# Patient Record
Sex: FEMALE | Race: WHITE | NOT HISPANIC OR LATINO | Employment: OTHER | URBAN - METROPOLITAN AREA
[De-identification: names, ages, dates, MRNs, and addresses within clinical notes are randomized per-mention and may not be internally consistent; named-entity substitution may affect disease eponyms.]

---

## 2018-02-09 LAB
ANION GAP SERPL CALCULATED.3IONS-SCNC: 10 MMOL/L (ref 10–20)
BICARBONATE: 31 MMOL/L (ref 21–32)
BUN / CREAT RATIO: 38 (ref 5–25)
CALCIUM SERPL-MCNC: 8.6 MG/DL (ref 8.6–10.3)
CHLORIDE BLD-SCNC: 97 MMOL/L (ref 98–107)
CREAT SERPL-MCNC: 0.73 MG/DL (ref 0.5–1.05)
ERYTHROCYTE [DISTWIDTH] IN BLOOD BY AUTOMATED COUNT: 12.6 % (ref 12–15.4)
ERYTHROCYTE [DISTWIDTH] IN BLOOD BY AUTOMATED COUNT: 43 FL (ref 39.3–48.6)
GFR CALCULATED: >60
GLUCOSE: 219 MG/DL (ref 70–100)
HCT VFR BLD CALC: 36.6 % (ref 36.5–46.6)
HEMOGLOBIN: 12.5 G/DL (ref 11.8–15.3)
MCH RBC QN AUTO: 31.6 PG (ref 27.5–33)
MCHC RBC AUTO-ENTMCNC: 34.2 G/DL (ref 30.1–35)
MCV RBC AUTO: 92.4 FL (ref 85.4–100)
NUCLEATED RBCS: 0 /100{WBCS}
PLATELET # BLD: 237 10*3/UL (ref 155–404)
PMV BLD AUTO: 10 FL (ref 9.9–12.1)
POTASSIUM SERPL-SCNC: 4.9 MMOL/L (ref 3.5–5.1)
RBC: 3.96 10*6/UL (ref 3.85–5.1)
RBCS COUNTED: 0 10*3/UL
SODIUM BLD-SCNC: 133 MMOL/L (ref 136–145)
UREA NITROGEN: 28 MG/DL (ref 6–23)
WBC: 5.8 10*3/UL (ref 4.4–9.9)

## 2023-03-08 ENCOUNTER — NURSING HOME VISIT (OUTPATIENT)
Dept: POST ACUTE CARE | Facility: EXTERNAL LOCATION | Age: 88
End: 2023-03-08
Payer: MEDICARE

## 2023-03-08 DIAGNOSIS — E03.9 ACQUIRED HYPOTHYROIDISM: ICD-10-CM

## 2023-03-08 DIAGNOSIS — R29.6 FALLS FREQUENTLY: ICD-10-CM

## 2023-03-08 DIAGNOSIS — J44.9 CHRONIC OBSTRUCTIVE PULMONARY DISEASE, UNSPECIFIED COPD TYPE (MULTI): Primary | ICD-10-CM

## 2023-03-08 DIAGNOSIS — R54 AGE-RELATED PHYSICAL DEBILITY: ICD-10-CM

## 2023-03-08 DIAGNOSIS — I50.32 CHRONIC DIASTOLIC HEART FAILURE (MULTI): ICD-10-CM

## 2023-03-08 PROCEDURE — 99308 SBSQ NF CARE LOW MDM 20: CPT | Performed by: INTERNAL MEDICINE

## 2023-03-08 NOTE — LETTER
Subjective  Patient ID: Naida Bruno is a 89 y.o. female who is long term resident being seen and evaluated for multiple medical problems.    Patient was seen for long-term care visit, after seen by me in the beginning actually patient looks much better, today patient was sitting upright, patient was eating her meals, she was assisted, she is 89-year-old female patient, she is admitted here for long-term care, multiple ER visits, multiple hospitalization, multiple falls and vision impairment, unable to see, unable to take care of herself, age-related physical decline, cognitive decline, absolutely impaired ADLs, frequent falls and fractures, patient is doing well at least 2 to 3 weeks of stay over here.  Medications are reviewed.  Laboratories are reviewed.  Skin assessment was done.  Last time's evaluation were reviewed, therapeutics were reviewed.         Review of Systems   Constitutional:  Positive for fatigue. Negative for chills.   HENT:  Negative for congestion.    Eyes: Negative.    Respiratory:  Positive for shortness of breath. Negative for chest tightness.    Cardiovascular: Negative.    Gastrointestinal: Negative.    Musculoskeletal: Negative.    Skin:  Negative for rash and wound.   Neurological: Negative.        Objective  /53   Pulse 76   Wt 77.6 kg (171 lb)   BMI 31.28 kg/m²     Physical Exam  Vitals and nursing note reviewed.   Constitutional:       Appearance: Normal appearance. She is normal weight.   HENT:      Head: Atraumatic.   Eyes:      Conjunctiva/sclera: Conjunctivae normal.   Cardiovascular:      Rate and Rhythm: Normal rate and regular rhythm.   Pulmonary:      Effort: Pulmonary effort is normal.      Breath sounds: Rales present.   Abdominal:      Palpations: Abdomen is soft.   Musculoskeletal:         General: Normal range of motion.      Cervical back: Normal range of motion.   Skin:     General: Skin is warm and dry.   Neurological:      General: No focal deficit  present.   Psychiatric:         Mood and Affect: Mood is not anxious. Affect is not angry.         Behavior: Behavior normal.         Cognition and Memory: Memory is impaired. She exhibits impaired recent memory and impaired remote memory.         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  Alarms could be applied if needed.  No breathing compromise, no confusion or agitation, no infectious ailments.  Discussed with nursing staff at bedside.  Patient's prognosis remains poor.  This patient was seen for my regular monthly visit, nursing evaluations and nursing notes were reviewed, interim events are reviewed, interim concerns and messages were reviewed as we have communicated with nursing staff.  Any issues with the falls, skin care impairment, declining physical condition are reviewed and noted, diagnosis list were reviewed, list of medications were reviewed, living will related issues were reviewed, overall patient has been doing well, any declining in patient's condition or any change in patient's condition needs to be notified to physician promptly, discussed with nursing staff, if needed would communicate with family.  Patient stays confined here at the facility for long-term care, there are always concerns about long-term care related issues and concerns.  Nursing staff is trying their best to keep patient safe, all sort of measures has been taken to keep patient safe and comfortable.       Goals    None

## 2023-03-09 VITALS
WEIGHT: 171 LBS | BODY MASS INDEX: 31.28 KG/M2 | DIASTOLIC BLOOD PRESSURE: 53 MMHG | SYSTOLIC BLOOD PRESSURE: 133 MMHG | HEART RATE: 76 BPM

## 2023-03-09 ASSESSMENT — ENCOUNTER SYMPTOMS
EYES NEGATIVE: 1
FATIGUE: 1
MUSCULOSKELETAL NEGATIVE: 1
NEUROLOGICAL NEGATIVE: 1
CHILLS: 0
WOUND: 0
CHEST TIGHTNESS: 0
SHORTNESS OF BREATH: 1
GASTROINTESTINAL NEGATIVE: 1
CARDIOVASCULAR NEGATIVE: 1

## 2023-03-10 NOTE — PROGRESS NOTES
Subjective   Patient ID: Naida Bruno is a 89 y.o. female who is long term resident being seen and evaluated for multiple medical problems.    Patient was seen for long-term care visit, after seen by me in the beginning actually patient looks much better, today patient was sitting upright, patient was eating her meals, she was assisted, she is 89-year-old female patient, she is admitted here for long-term care, multiple ER visits, multiple hospitalization, multiple falls and vision impairment, unable to see, unable to take care of herself, age-related physical decline, cognitive decline, absolutely impaired ADLs, frequent falls and fractures, patient is doing well at least 2 to 3 weeks of stay over here.  Medications are reviewed.  Laboratories are reviewed.  Skin assessment was done.  Last time's evaluation were reviewed, therapeutics were reviewed.         Review of Systems   Constitutional:  Positive for fatigue. Negative for chills.   HENT:  Negative for congestion.    Eyes: Negative.    Respiratory:  Positive for shortness of breath. Negative for chest tightness.    Cardiovascular: Negative.    Gastrointestinal: Negative.    Musculoskeletal: Negative.    Skin:  Negative for rash and wound.   Neurological: Negative.        Objective   /53   Pulse 76   Wt 77.6 kg (171 lb)   BMI 31.28 kg/m²     Physical Exam  Vitals and nursing note reviewed.   Constitutional:       Appearance: Normal appearance. She is normal weight.   HENT:      Head: Atraumatic.   Eyes:      Conjunctiva/sclera: Conjunctivae normal.   Cardiovascular:      Rate and Rhythm: Normal rate and regular rhythm.   Pulmonary:      Effort: Pulmonary effort is normal.      Breath sounds: Rales present.   Abdominal:      Palpations: Abdomen is soft.   Musculoskeletal:         General: Normal range of motion.      Cervical back: Normal range of motion.   Skin:     General: Skin is warm and dry.   Neurological:      General: No focal deficit  present.   Psychiatric:         Mood and Affect: Mood is not anxious. Affect is not angry.         Behavior: Behavior normal.         Cognition and Memory: Memory is impaired. She exhibits impaired recent memory and impaired remote memory.         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  Alarms could be applied if needed.  No breathing compromise, no confusion or agitation, no infectious ailments.  Discussed with nursing staff at bedside.  Patient's prognosis remains poor.  This patient was seen for my regular monthly visit, nursing evaluations and nursing notes were reviewed, interim events are reviewed, interim concerns and messages were reviewed as we have communicated with nursing staff.  Any issues with the falls, skin care impairment, declining physical condition are reviewed and noted, diagnosis list were reviewed, list of medications were reviewed, living will related issues were reviewed, overall patient has been doing well, any declining in patient's condition or any change in patient's condition needs to be notified to physician promptly, discussed with nursing staff, if needed would communicate with family.  Patient stays confined here at the facility for long-term care, there are always concerns about long-term care related issues and concerns.  Nursing staff is trying their best to keep patient safe, all sort of measures has been taken to keep patient safe and comfortable.       Goals    None

## 2023-04-05 ENCOUNTER — NURSING HOME VISIT (OUTPATIENT)
Dept: POST ACUTE CARE | Facility: EXTERNAL LOCATION | Age: 88
End: 2023-04-05
Payer: MEDICARE

## 2023-04-05 DIAGNOSIS — I10 PRIMARY HYPERTENSION: ICD-10-CM

## 2023-04-05 DIAGNOSIS — E03.9 ACQUIRED HYPOTHYROIDISM: ICD-10-CM

## 2023-04-05 DIAGNOSIS — R53.81 PHYSICAL DECONDITIONING: ICD-10-CM

## 2023-04-05 DIAGNOSIS — J44.9 CHRONIC OBSTRUCTIVE PULMONARY DISEASE, UNSPECIFIED COPD TYPE (MULTI): Primary | ICD-10-CM

## 2023-04-05 DIAGNOSIS — I50.32 CHRONIC DIASTOLIC HEART FAILURE (MULTI): ICD-10-CM

## 2023-04-05 PROCEDURE — 99308 SBSQ NF CARE LOW MDM 20: CPT | Performed by: INTERNAL MEDICINE

## 2023-04-05 NOTE — LETTER
Patient: Naida Bruno  : 1922    Encounter Date: 2023    Subjective  Patient ID: Naida Bruno is a 89 y.o. female who is long term resident being seen and evaluated for multiple medical problems.    89-year-old female patient, condition remains unchanged, she is legally blind, she is asking me that anything can be done about her legal blindness test tests and I told that no, patient has a frequent falls and hence patient has been admitted here for long-term care, patient remains on Lexapro, she has history of multiple falls, fractures, generalized debility, unsteadiness of gait, patient has hypothyroidism, history of diastolic heart failure, there is no fluid overload, intermittent confusion is present, nursing staff did not have any questions or concerns, daily routine has been reviewed, essentially bedbound, essentially completely dependent upon the people for care and activities, appetite has been fair, periodic lab has been showing mild anemia, comfort care and supportive care has been continued.         Review of Systems  onstitutional:  Positive for fatigue. Negative for chills.   HENT:  Negative for congestion.    Eyes: legally blind  Respiratory:  Positive for shortness of breath. Negative for chest tightness.    Cardiovascular: Negative.    Gastrointestinal: Negative.    Musculoskeletal: Negative.    Skin:  Negative for rash and wound.   Neurological: Negative.      Objective  /63   Pulse 73   Wt 74.8 kg (165 lb)   BMI 30.18 kg/m²     Physical Exam  Vitals and nursing note reviewed.   Constitutional:       Appearance: Normal appearance. She is normal weight.   HENT:      Head: Atraumatic.   Eyes:      Conjunctiva/sclera: Conjunctivae normal.   Cardiovascular:      Rate and Rhythm: Normal rate and regular rhythm.   Pulmonary:      Effort: Pulmonary effort is normal.      Breath sounds: Rales present.   Abdominal:      Palpations: Abdomen is soft.   Musculoskeletal:          General: Normal range of motion.      Cervical back: Normal range of motion.   Skin:     General: Skin is warm and dry.   Neurological:      General: No focal deficit present.   Psychiatric:         Mood and Affect: Mood is not anxious. Affect is not angry.         Behavior: Behavior normal.         Cognition and Memory: Memory is impaired. She exhibits impaired recent memory and impaired remote memory.     Assessment/Plan  Problem List Items Addressed This Visit          Respiratory    Chronic obstructive pulmonary disease (CMS/HCC) - Primary       Circulatory    Hypertension    Chronic diastolic heart failure (CMS/HCC)       Endocrine/Metabolic    Acquired hypothyroidism       Other    Physical deconditioning   Patient's general condition is unchanged, quite debilitated female, elderly female patient, absolutely poor physical health, no fluid overload, Lexapro is a longstanding therapy, does not have any pain and discomfort, maintain therapy as listed, pressure sores needs to be prevented, frequent monitoring and assessment of patient's condition needs to be done, no pain or discomfort to be out of ordinary, infrequent laboratories has been done, blood sugars are reviewed, hemodynamics are reviewed, prognosis remains poor, stable pattern, daily routine needs to be well kept, excellent nursing care has been provided.  This patient was seen for my regular monthly visit, nursing evaluations and nursing notes were reviewed, interim events are reviewed, interim concerns and messages were reviewed as we have communicated with nursing staff.  Any issues with the falls, skin care impairment, declining physical condition are reviewed and noted, diagnosis list were reviewed, list of medications were reviewed, living will related issues were reviewed, overall patient has been doing well, any declining in patient's condition or any change in patient's condition needs to be notified to physician promptly, discussed with  nursing staff, if needed would communicate with family.  Patient stays confined here at the facility for long-term care, there are always concerns about long-term care related issues and concerns.  Nursing staff is trying their best to keep patient safe, all sort of measures has been taken to keep patient safe and comfortable.      Goals    None           Electronically Signed By: Jayesh Arzate MD   4/7/23  8:39 PM

## 2023-04-07 VITALS
WEIGHT: 165 LBS | DIASTOLIC BLOOD PRESSURE: 63 MMHG | SYSTOLIC BLOOD PRESSURE: 140 MMHG | HEART RATE: 73 BPM | BODY MASS INDEX: 30.18 KG/M2

## 2023-04-07 PROBLEM — I50.32 CHRONIC DIASTOLIC HEART FAILURE (MULTI): Status: ACTIVE | Noted: 2023-04-07

## 2023-04-07 PROBLEM — R53.81 PHYSICAL DECONDITIONING: Status: ACTIVE | Noted: 2023-04-07

## 2023-04-07 PROBLEM — I10 HYPERTENSION: Status: ACTIVE | Noted: 2023-04-07

## 2023-04-07 PROBLEM — J44.9 CHRONIC OBSTRUCTIVE PULMONARY DISEASE (MULTI): Status: ACTIVE | Noted: 2023-04-07

## 2023-04-07 PROBLEM — E03.9 ACQUIRED HYPOTHYROIDISM: Status: ACTIVE | Noted: 2023-04-07

## 2023-04-08 NOTE — PROGRESS NOTES
Subjective   Patient ID: Naida Bruno is a 89 y.o. female who is long term resident being seen and evaluated for multiple medical problems.    89-year-old female patient, condition remains unchanged, she is legally blind, she is asking me that anything can be done about her legal blindness test tests and I told that no, patient has a frequent falls and hence patient has been admitted here for long-term care, patient remains on Lexapro, she has history of multiple falls, fractures, generalized debility, unsteadiness of gait, patient has hypothyroidism, history of diastolic heart failure, there is no fluid overload, intermittent confusion is present, nursing staff did not have any questions or concerns, daily routine has been reviewed, essentially bedbound, essentially completely dependent upon the people for care and activities, appetite has been fair, periodic lab has been showing mild anemia, comfort care and supportive care has been continued.         Review of Systems  onstitutional:  Positive for fatigue. Negative for chills.   HENT:  Negative for congestion.    Eyes: legally blind  Respiratory:  Positive for shortness of breath. Negative for chest tightness.    Cardiovascular: Negative.    Gastrointestinal: Negative.    Musculoskeletal: Negative.    Skin:  Negative for rash and wound.   Neurological: Negative.      Objective   /63   Pulse 73   Wt 74.8 kg (165 lb)   BMI 30.18 kg/m²     Physical Exam  Vitals and nursing note reviewed.   Constitutional:       Appearance: Normal appearance. She is normal weight.   HENT:      Head: Atraumatic.   Eyes:      Conjunctiva/sclera: Conjunctivae normal.   Cardiovascular:      Rate and Rhythm: Normal rate and regular rhythm.   Pulmonary:      Effort: Pulmonary effort is normal.      Breath sounds: Rales present.   Abdominal:      Palpations: Abdomen is soft.   Musculoskeletal:         General: Normal range of motion.      Cervical back: Normal range of  motion.   Skin:     General: Skin is warm and dry.   Neurological:      General: No focal deficit present.   Psychiatric:         Mood and Affect: Mood is not anxious. Affect is not angry.         Behavior: Behavior normal.         Cognition and Memory: Memory is impaired. She exhibits impaired recent memory and impaired remote memory.     Assessment/Plan   Problem List Items Addressed This Visit          Respiratory    Chronic obstructive pulmonary disease (CMS/HCC) - Primary       Circulatory    Hypertension    Chronic diastolic heart failure (CMS/HCC)       Endocrine/Metabolic    Acquired hypothyroidism       Other    Physical deconditioning   Patient's general condition is unchanged, quite debilitated female, elderly female patient, absolutely poor physical health, no fluid overload, Lexapro is a longstanding therapy, does not have any pain and discomfort, maintain therapy as listed, pressure sores needs to be prevented, frequent monitoring and assessment of patient's condition needs to be done, no pain or discomfort to be out of ordinary, infrequent laboratories has been done, blood sugars are reviewed, hemodynamics are reviewed, prognosis remains poor, stable pattern, daily routine needs to be well kept, excellent nursing care has been provided.  This patient was seen for my regular monthly visit, nursing evaluations and nursing notes were reviewed, interim events are reviewed, interim concerns and messages were reviewed as we have communicated with nursing staff.  Any issues with the falls, skin care impairment, declining physical condition are reviewed and noted, diagnosis list were reviewed, list of medications were reviewed, living will related issues were reviewed, overall patient has been doing well, any declining in patient's condition or any change in patient's condition needs to be notified to physician promptly, discussed with nursing staff, if needed would communicate with family.  Patient stays  confined here at the facility for long-term care, there are always concerns about long-term care related issues and concerns.  Nursing staff is trying their best to keep patient safe, all sort of measures has been taken to keep patient safe and comfortable.      Goals    None

## 2023-05-10 ENCOUNTER — NURSING HOME VISIT (OUTPATIENT)
Dept: POST ACUTE CARE | Facility: EXTERNAL LOCATION | Age: 88
End: 2023-05-10
Payer: COMMERCIAL

## 2023-05-10 DIAGNOSIS — I10 PRIMARY HYPERTENSION: ICD-10-CM

## 2023-05-10 DIAGNOSIS — J44.9 CHRONIC OBSTRUCTIVE PULMONARY DISEASE, UNSPECIFIED COPD TYPE (MULTI): Primary | ICD-10-CM

## 2023-05-10 DIAGNOSIS — I50.32 CHRONIC DIASTOLIC HEART FAILURE (MULTI): ICD-10-CM

## 2023-05-10 DIAGNOSIS — R54 AGE-RELATED PHYSICAL DEBILITY: ICD-10-CM

## 2023-05-10 DIAGNOSIS — E03.9 ACQUIRED HYPOTHYROIDISM: ICD-10-CM

## 2023-05-10 PROCEDURE — 99308 SBSQ NF CARE LOW MDM 20: CPT | Performed by: INTERNAL MEDICINE

## 2023-05-10 NOTE — LETTER
Patient: Naida Bruno  : 1922    Encounter Date: 05/10/2023    Subjective  Patient ID: Naida Bruno is a 89 y.o. female who is long term resident being seen and evaluated for multiple medical problems.    89-year-old female patient was seen for regular follow-up care visit.  She has been confined here for long-term care, she has not have any fall, she has not have any confusion, she has a hearing impairment, she has a visual impairment.  She is not able to ambulate, she remains on scant therapeutics which include Lexapro and levothyroxine, nursing staff did not have any questions or concerns, daily routine has been well kept and maintained, no confusion disorientation, no infectious ailments, no aspiration.  Patient remains DNR CC.         Review of Systems  onstitutional:  Positive for fatigue. Negative for chills.   HENT:  Negative for congestion.    Eyes: legally blind  Respiratory:  Positive for shortness of breath. Negative for chest tightness.    Cardiovascular: Negative.    Gastrointestinal: Negative.    Musculoskeletal: Negative.    Skin:  Negative for rash and wound.   Neurological: Negative.    Objective  /82   Pulse 76   Wt 75.8 kg (167 lb)   BMI 30.54 kg/m²     Physical Exam  Vitals reviewed.   Constitutional:       Appearance: Normal appearance. She is normal weight.   HENT:      Head: Normocephalic and atraumatic.   Eyes:      Conjunctiva/sclera: Conjunctivae normal.   Cardiovascular:      Rate and Rhythm: Normal rate and regular rhythm.      Pulses: Normal pulses.   Pulmonary:      Effort: Pulmonary effort is normal.      Breath sounds: Rales present.   Abdominal:      Palpations: Abdomen is soft.   Musculoskeletal:         General: Tenderness and deformity present. Normal range of motion.      Cervical back: Normal range of motion.      Right lower leg: Edema present.      Left lower leg: Edema present.   Skin:     General: Skin is warm and dry.   Neurological:      General:  No focal deficit present.      Mental Status: She is oriented to person, place, and time.   Psychiatric:         Mood and Affect: Mood normal.         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  Appetite has been fair.  Orientation and communication is maintained.  Age-related physical decline is present.  Nursing staff at bedside.  She has a previous multiple falls and orthopedic injuries.  Routine care precautions has to be kept and maintained, continue medications as listed.  Periodic laboratories has been unremarkable.     Goals    None           Electronically Signed By: Jayesh Arzate MD   5/13/23 10:13 PM

## 2023-05-13 VITALS
HEART RATE: 76 BPM | BODY MASS INDEX: 30.54 KG/M2 | DIASTOLIC BLOOD PRESSURE: 82 MMHG | WEIGHT: 167 LBS | SYSTOLIC BLOOD PRESSURE: 145 MMHG

## 2023-05-13 PROBLEM — R54 AGE-RELATED PHYSICAL DEBILITY: Status: ACTIVE | Noted: 2023-05-13

## 2023-05-14 NOTE — PROGRESS NOTES
Subjective   Patient ID: Naida Bruno is a 89 y.o. female who is long term resident being seen and evaluated for multiple medical problems.    89-year-old female patient was seen for regular follow-up care visit.  She has been confined here for long-term care, she has not have any fall, she has not have any confusion, she has a hearing impairment, she has a visual impairment.  She is not able to ambulate, she remains on scant therapeutics which include Lexapro and levothyroxine, nursing staff did not have any questions or concerns, daily routine has been well kept and maintained, no confusion disorientation, no infectious ailments, no aspiration.  Patient remains DNR CC.         Review of Systems  onstitutional:  Positive for fatigue. Negative for chills.   HENT:  Negative for congestion.    Eyes: legally blind  Respiratory:  Positive for shortness of breath. Negative for chest tightness.    Cardiovascular: Negative.    Gastrointestinal: Negative.    Musculoskeletal: Negative.    Skin:  Negative for rash and wound.   Neurological: Negative.    Objective   /82   Pulse 76   Wt 75.8 kg (167 lb)   BMI 30.54 kg/m²     Physical Exam  Vitals reviewed.   Constitutional:       Appearance: Normal appearance. She is normal weight.   HENT:      Head: Normocephalic and atraumatic.   Eyes:      Conjunctiva/sclera: Conjunctivae normal.   Cardiovascular:      Rate and Rhythm: Normal rate and regular rhythm.      Pulses: Normal pulses.   Pulmonary:      Effort: Pulmonary effort is normal.      Breath sounds: Rales present.   Abdominal:      Palpations: Abdomen is soft.   Musculoskeletal:         General: Tenderness and deformity present. Normal range of motion.      Cervical back: Normal range of motion.      Right lower leg: Edema present.      Left lower leg: Edema present.   Skin:     General: Skin is warm and dry.   Neurological:      General: No focal deficit present.      Mental Status: She is oriented to  person, place, and time.   Psychiatric:         Mood and Affect: Mood normal.         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  Appetite has been fair.  Orientation and communication is maintained.  Age-related physical decline is present.  Nursing staff at bedside.  She has a previous multiple falls and orthopedic injuries.  Routine care precautions has to be kept and maintained, continue medications as listed.  Periodic laboratories has been unremarkable.     Goals    None

## 2023-06-14 ENCOUNTER — NURSING HOME VISIT (OUTPATIENT)
Dept: POST ACUTE CARE | Facility: EXTERNAL LOCATION | Age: 88
End: 2023-06-14
Payer: COMMERCIAL

## 2023-06-14 DIAGNOSIS — I50.32 CHRONIC DIASTOLIC HEART FAILURE (MULTI): ICD-10-CM

## 2023-06-14 DIAGNOSIS — J44.9 CHRONIC OBSTRUCTIVE PULMONARY DISEASE, UNSPECIFIED COPD TYPE (MULTI): Primary | ICD-10-CM

## 2023-06-14 DIAGNOSIS — R54 AGE-RELATED PHYSICAL DEBILITY: ICD-10-CM

## 2023-06-14 DIAGNOSIS — I10 PRIMARY HYPERTENSION: ICD-10-CM

## 2023-06-14 DIAGNOSIS — E03.9 ACQUIRED HYPOTHYROIDISM: ICD-10-CM

## 2023-06-14 PROCEDURE — 99308 SBSQ NF CARE LOW MDM 20: CPT | Performed by: INTERNAL MEDICINE

## 2023-06-14 NOTE — LETTER
Patient: Naida Bruno  : 1922    Encounter Date: 2023    Subjective  Patient ID: Naida Bruno is a 89 y.o. female who is long term resident being seen and evaluated for multiple medical problems.    Patient continued to remain legally blind, patient has received cefuroxime recently because of cough and respiratory tract infection.  Patient remains on Lexapro.  She is 89-year-old female patient confined for long-term care, no major changes, no significant change in the weight, appetite has been fair, she was having frequent falls, she was having couple of fractures, she has frequent hospitalization and could not be sustained here in the homebound situation so patient is nursing home bound since beginning of the year.  Nursing staff did not have any questions or concerns, remains on very scant therapeutics.         Review of Systems  nstitutional:  Positive for fatigue. Negative for chills. Impaired vision  HENT:  Negative for congestion.    Eyes: legally blind  Respiratory:  Positive for shortness of breath. Negative for chest tightness.    Cardiovascular: Negative.    Gastrointestinal: Negative.    Musculoskeletal: Negative.    Skin:  Negative for rash and wound.   Objective  /64   Pulse 75   Wt 76.2 kg (168 lb)   BMI 30.73 kg/m²     Physical Exam  Vitals and nursing note reviewed.   Constitutional:       Appearance: Normal appearance. She is normal weight.   HENT:      Head: Atraumatic.   Eyes:      Conjunctiva/sclera: Conjunctivae normal.   Cardiovascular:      Rate and Rhythm: Normal rate and regular rhythm.   Pulmonary:      Effort: Pulmonary effort is normal.      Breath sounds: Rales present.   Abdominal:      Palpations: Abdomen is soft.   Musculoskeletal:         General: Normal range of motion.      Cervical back: Normal range of motion.   Skin:     General: Skin is warm and dry.   Neurological:      General: No focal deficit present.   Psychiatric:         Mood and Affect:  Mood is not anxious. Affect is not angry.         Behavior: Behavior normal.         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  Patient has a age-related physical debility that is going to persist.  Respiratory infection has been subsided, anxiety has been much better, she is bedbound, nursing staff did not report any pressure sores or pressure ulcers.  Long-term prognosis remains poor because of age-related physical decline and age related morbidity and mortality.  Comfort measure, supportive care to be continued, no extra medication admission required.     Goals    None           Electronically Signed By: Jayesh Arzate MD   6/20/23  9:26 PM

## 2023-06-20 VITALS
SYSTOLIC BLOOD PRESSURE: 128 MMHG | DIASTOLIC BLOOD PRESSURE: 64 MMHG | WEIGHT: 168 LBS | BODY MASS INDEX: 30.73 KG/M2 | HEART RATE: 75 BPM

## 2023-06-21 NOTE — PROGRESS NOTES
Subjective   Patient ID: Naida Bruno is a 89 y.o. female who is long term resident being seen and evaluated for multiple medical problems.    Patient continued to remain legally blind, patient has received cefuroxime recently because of cough and respiratory tract infection.  Patient remains on Lexapro.  She is 89-year-old female patient confined for long-term care, no major changes, no significant change in the weight, appetite has been fair, she was having frequent falls, she was having couple of fractures, she has frequent hospitalization and could not be sustained here in the homebound situation so patient is nursing home bound since beginning of the year.  Nursing staff did not have any questions or concerns, remains on very scant therapeutics.         Review of Systems  nstitutional:  Positive for fatigue. Negative for chills. Impaired vision  HENT:  Negative for congestion.    Eyes: legally blind  Respiratory:  Positive for shortness of breath. Negative for chest tightness.    Cardiovascular: Negative.    Gastrointestinal: Negative.    Musculoskeletal: Negative.    Skin:  Negative for rash and wound.   Objective   /64   Pulse 75   Wt 76.2 kg (168 lb)   BMI 30.73 kg/m²     Physical Exam  Vitals and nursing note reviewed.   Constitutional:       Appearance: Normal appearance. She is normal weight.   HENT:      Head: Atraumatic.   Eyes:      Conjunctiva/sclera: Conjunctivae normal.   Cardiovascular:      Rate and Rhythm: Normal rate and regular rhythm.   Pulmonary:      Effort: Pulmonary effort is normal.      Breath sounds: Rales present.   Abdominal:      Palpations: Abdomen is soft.   Musculoskeletal:         General: Normal range of motion.      Cervical back: Normal range of motion.   Skin:     General: Skin is warm and dry.   Neurological:      General: No focal deficit present.   Psychiatric:         Mood and Affect: Mood is not anxious. Affect is not angry.         Behavior: Behavior  normal.         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  Patient has a age-related physical debility that is going to persist.  Respiratory infection has been subsided, anxiety has been much better, she is bedbound, nursing staff did not report any pressure sores or pressure ulcers.  Long-term prognosis remains poor because of age-related physical decline and age related morbidity and mortality.  Comfort measure, supportive care to be continued, no extra medication admission required.     Goals    None

## 2023-07-12 ENCOUNTER — NURSING HOME VISIT (OUTPATIENT)
Dept: POST ACUTE CARE | Facility: EXTERNAL LOCATION | Age: 88
End: 2023-07-12
Payer: COMMERCIAL

## 2023-07-12 DIAGNOSIS — I10 PRIMARY HYPERTENSION: ICD-10-CM

## 2023-07-12 DIAGNOSIS — E03.9 ACQUIRED HYPOTHYROIDISM: ICD-10-CM

## 2023-07-12 DIAGNOSIS — J44.9 CHRONIC OBSTRUCTIVE PULMONARY DISEASE, UNSPECIFIED COPD TYPE (MULTI): Primary | ICD-10-CM

## 2023-07-12 DIAGNOSIS — I50.32 CHRONIC DIASTOLIC HEART FAILURE (MULTI): ICD-10-CM

## 2023-07-12 PROCEDURE — 99308 SBSQ NF CARE LOW MDM 20: CPT | Performed by: INTERNAL MEDICINE

## 2023-07-12 NOTE — LETTER
Patient: Naida Bruno  : 1922    Encounter Date: 2023    Subjective  Patient ID: Naida Bruno is a 89 y.o. female who is long term resident being seen and evaluated for multiple medical problems.    89-year-old female patient, maintaining same routine, legal blindness is present, assisted for daily activities.  No fever, no chills, no intermittent confusion is present.  History of diastolic heart failure without any problem.  Patient is confined here for long-term care after back-and-forth hospitalizations.  Patient has a COPD of unknown severity, no wheezing, no shortness of breath, no aspiration, no UTI.  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  Nursing staff did not have any questions or concerns.         Review of Systems  onstitutional:  Positive for fatigue. Negative for chills.   HENT:  Negative for congestion.    Eyes: legally blind  Respiratory:  Positive for shortness of breath. Negative for chest tightness.    Cardiovascular: Negative.    Gastrointestinal: Negative.    Musculoskeletal: Negative.    Skin:  Negative for rash and wound.   Neurological: Negative.    Objective  There were no vitals taken for this visit.    Physical Exam  Vitals and nursing note reviewed.   Constitutional:       Appearance: Normal appearance. She is normal weight.   HENT:      Head: Atraumatic.   Eyes:      Conjunctiva/sclera: Conjunctivae normal.   Cardiovascular:      Rate and Rhythm: Normal rate and regular rhythm.   Pulmonary:      Effort: Pulmonary effort is normal.      Breath sounds: Rales present.   Abdominal:      Palpations: Abdomen is soft.   Musculoskeletal:         General: Normal range of motion.      Cervical back: Normal range of motion.   Skin:     General: Skin is warm and dry.   Neurological:      General: No focal deficit present.   Psychiatric:         Mood and Affect: Mood is not anxious. Affect is not angry.          Behavior: Behavior normal.         Cognition and Memory: Memory is impaired  Assessment/Plan  Problem List Items Addressed This Visit       Hypertension    Chronic diastolic heart failure (CMS/HCC)    Acquired hypothyroidism    Chronic obstructive pulmonary disease (CMS/HCC) - Primary   Continue medications as listed, no changes are necessary, daily routine has been well kept and maintained, there is no family member at bedside.  Discussed with nursing staff.  Assisted for daily activities.  Legal blindness and debility and in unable to do any ADLs and frequent falls at home related to her long-term confinement.  Long-term prognosis remains impaired and same pattern and same medications to be continued and maintained.     Goals    None           Electronically Signed By: Jayesh Arzate MD   7/17/23  9:47 PM

## 2023-07-17 VITALS
SYSTOLIC BLOOD PRESSURE: 119 MMHG | BODY MASS INDEX: 30.18 KG/M2 | DIASTOLIC BLOOD PRESSURE: 62 MMHG | WEIGHT: 165 LBS | HEART RATE: 63 BPM

## 2023-07-18 NOTE — PROGRESS NOTES
Subjective   Patient ID: Naida Bruno is a 89 y.o. female who is long term resident being seen and evaluated for multiple medical problems.    89-year-old female patient, maintaining same routine, legal blindness is present, assisted for daily activities.  No fever, no chills, no intermittent confusion is present.  History of diastolic heart failure without any problem.  Patient is confined here for long-term care after back-and-forth hospitalizations.  Patient has a COPD of unknown severity, no wheezing, no shortness of breath, no aspiration, no UTI.  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  Nursing staff did not have any questions or concerns.         Review of Systems  onstitutional:  Positive for fatigue. Negative for chills.   HENT:  Negative for congestion.    Eyes: legally blind  Respiratory:  Positive for shortness of breath. Negative for chest tightness.    Cardiovascular: Negative.    Gastrointestinal: Negative.    Musculoskeletal: Negative.    Skin:  Negative for rash and wound.   Neurological: Negative.    Objective   There were no vitals taken for this visit.    Physical Exam  Vitals and nursing note reviewed.   Constitutional:       Appearance: Normal appearance. She is normal weight.   HENT:      Head: Atraumatic.   Eyes:      Conjunctiva/sclera: Conjunctivae normal.   Cardiovascular:      Rate and Rhythm: Normal rate and regular rhythm.   Pulmonary:      Effort: Pulmonary effort is normal.      Breath sounds: Rales present.   Abdominal:      Palpations: Abdomen is soft.   Musculoskeletal:         General: Normal range of motion.      Cervical back: Normal range of motion.   Skin:     General: Skin is warm and dry.   Neurological:      General: No focal deficit present.   Psychiatric:         Mood and Affect: Mood is not anxious. Affect is not angry.         Behavior: Behavior normal.         Cognition and Memory: Memory  is impaired  Assessment/Plan   Problem List Items Addressed This Visit       Hypertension    Chronic diastolic heart failure (CMS/HCC)    Acquired hypothyroidism    Chronic obstructive pulmonary disease (CMS/HCC) - Primary   Continue medications as listed, no changes are necessary, daily routine has been well kept and maintained, there is no family member at bedside.  Discussed with nursing staff.  Assisted for daily activities.  Legal blindness and debility and in unable to do any ADLs and frequent falls at home related to her long-term confinement.  Long-term prognosis remains impaired and same pattern and same medications to be continued and maintained.     Goals    None

## 2023-08-09 ENCOUNTER — NURSING HOME VISIT (OUTPATIENT)
Dept: POST ACUTE CARE | Facility: EXTERNAL LOCATION | Age: 88
End: 2023-08-09
Payer: COMMERCIAL

## 2023-08-09 DIAGNOSIS — I10 PRIMARY HYPERTENSION: ICD-10-CM

## 2023-08-09 DIAGNOSIS — J44.9 CHRONIC OBSTRUCTIVE PULMONARY DISEASE, UNSPECIFIED COPD TYPE (MULTI): Primary | ICD-10-CM

## 2023-08-09 DIAGNOSIS — Z78.9 IMPAIRED ACTIVITIES OF DAILY LIVING: ICD-10-CM

## 2023-08-09 DIAGNOSIS — I50.32 CHRONIC DIASTOLIC HEART FAILURE (MULTI): ICD-10-CM

## 2023-08-09 DIAGNOSIS — R54 AGE-RELATED PHYSICAL DEBILITY: ICD-10-CM

## 2023-08-09 DIAGNOSIS — E03.9 ACQUIRED HYPOTHYROIDISM: ICD-10-CM

## 2023-08-09 PROCEDURE — 99308 SBSQ NF CARE LOW MDM 20: CPT | Performed by: INTERNAL MEDICINE

## 2023-08-09 NOTE — LETTER
Patient: Naida Bruno  : 1922    Encounter Date: 2023    Subjective  Patient ID: Naida Bruno is a 89 y.o. female who is long term resident being seen and evaluated for multiple medical problems.    This patient's condition remains unchanged, visual impairment, bedbound status, unable to do any source of ADLs, advanced imaging, pulmonary fibrosis, generalized debility, chronic pains and discomfort and frequent falls are the issues.  Overall stable condition, she is 89-year-old female patient, family rarely visit her, not doing laboratories on a regular basis, no events or concerns, occasional confusion, very infrequently could be agitated, appetite has been fair, no fever chills or any other infectious ailments and patient remains on very minimum therapeutics.         Review of Systems  Constitutional:  Positive for fatigue. Negative for chills.   HENT:  Negative for congestion.    Eyes: legally blind  Respiratory:  Positive for shortness of breath. Negative for chest tightness.    Cardiovascular: Negative.    Gastrointestinal: Negative.    Musculoskeletal: Negative.    Skin:  Negative for rash and wound.   Objective  /65   Pulse 66   Wt 76.2 kg (168 lb)   BMI 30.73 kg/m²     Physical Exam  Vitals and nursing note reviewed.   Constitutional:       Appearance: Normal appearance. She is normal weight.   HENT:      Head: Atraumatic.   Eyes:      Conjunctiva/sclera: Conjunctivae normal.   Cardiovascular:      Rate and Rhythm: Normal rate and regular rhythm.   Pulmonary:      Effort: Pulmonary effort is normal.      Breath sounds: Rales present.   Abdominal:      Palpations: Abdomen is soft.   Musculoskeletal:         General: Normal range of motion.      Cervical back: Normal range of motion.   Skin:     General: Skin is warm and dry.   Neurological:      General: No focal deficit present.   Psychiatric:         Mood and Affect: Mood is not anxious.  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  Continue medications without any change or alteration although long-term prognosis remains impaired, comfort measures and supportive care has been updated and will be provided in the case of any worsening trends.  DNR CCA is documented.     Goals    None           Electronically Signed By: Jayesh Arzate MD   8/13/23  5:04 PM

## 2023-08-13 VITALS
SYSTOLIC BLOOD PRESSURE: 135 MMHG | BODY MASS INDEX: 30.73 KG/M2 | DIASTOLIC BLOOD PRESSURE: 65 MMHG | HEART RATE: 66 BPM | WEIGHT: 168 LBS

## 2023-08-13 NOTE — PROGRESS NOTES
Subjective   Patient ID: Naida Bruno is a 89 y.o. female who is long term resident being seen and evaluated for multiple medical problems.    This patient's condition remains unchanged, visual impairment, bedbound status, unable to do any source of ADLs, advanced imaging, pulmonary fibrosis, generalized debility, chronic pains and discomfort and frequent falls are the issues.  Overall stable condition, she is 89-year-old female patient, family rarely visit her, not doing laboratories on a regular basis, no events or concerns, occasional confusion, very infrequently could be agitated, appetite has been fair, no fever chills or any other infectious ailments and patient remains on very minimum therapeutics.         Review of Systems  Constitutional:  Positive for fatigue. Negative for chills.   HENT:  Negative for congestion.    Eyes: legally blind  Respiratory:  Positive for shortness of breath. Negative for chest tightness.    Cardiovascular: Negative.    Gastrointestinal: Negative.    Musculoskeletal: Negative.    Skin:  Negative for rash and wound.   Objective   /65   Pulse 66   Wt 76.2 kg (168 lb)   BMI 30.73 kg/m²     Physical Exam  Vitals and nursing note reviewed.   Constitutional:       Appearance: Normal appearance. She is normal weight.   HENT:      Head: Atraumatic.   Eyes:      Conjunctiva/sclera: Conjunctivae normal.   Cardiovascular:      Rate and Rhythm: Normal rate and regular rhythm.   Pulmonary:      Effort: Pulmonary effort is normal.      Breath sounds: Rales present.   Abdominal:      Palpations: Abdomen is soft.   Musculoskeletal:         General: Normal range of motion.      Cervical back: Normal range of motion.   Skin:     General: Skin is warm and dry.   Neurological:      General: No focal deficit present.   Psychiatric:         Mood and Affect: Mood is not anxious.  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  Continue medications without any change or alteration although long-term prognosis remains impaired, comfort measures and supportive care has been updated and will be provided in the case of any worsening trends.  DNR CCA is documented.     Goals    None

## 2023-09-13 ENCOUNTER — NURSING HOME VISIT (OUTPATIENT)
Dept: POST ACUTE CARE | Facility: EXTERNAL LOCATION | Age: 88
End: 2023-09-13
Payer: COMMERCIAL

## 2023-09-13 DIAGNOSIS — J02.9 PHARYNGITIS, UNSPECIFIED ETIOLOGY: ICD-10-CM

## 2023-09-13 DIAGNOSIS — I10 PRIMARY HYPERTENSION: ICD-10-CM

## 2023-09-13 DIAGNOSIS — J44.9 CHRONIC OBSTRUCTIVE PULMONARY DISEASE, UNSPECIFIED COPD TYPE (MULTI): Primary | ICD-10-CM

## 2023-09-13 DIAGNOSIS — R53.81 PHYSICAL DECONDITIONING: ICD-10-CM

## 2023-09-13 DIAGNOSIS — Z78.9 IMPAIRED ACTIVITIES OF DAILY LIVING: ICD-10-CM

## 2023-09-13 DIAGNOSIS — I50.32 CHRONIC DIASTOLIC HEART FAILURE (MULTI): ICD-10-CM

## 2023-09-13 DIAGNOSIS — E03.9 ACQUIRED HYPOTHYROIDISM: ICD-10-CM

## 2023-09-13 DIAGNOSIS — R54 AGE-RELATED PHYSICAL DEBILITY: ICD-10-CM

## 2023-09-13 PROCEDURE — 99308 SBSQ NF CARE LOW MDM 20: CPT | Performed by: INTERNAL MEDICINE

## 2023-09-13 NOTE — LETTER
Patient: Naida Bruno  : 1922    Encounter Date: 2023    Subjective  Patient ID: Naida Bruno is a 89 y.o. female who is long term resident being seen and evaluated for multiple medical problems.    Patient has been having sore throat, COVID-19 rapid antigen is negative.  Patient is 89-year-old female patient debilitated bedbound, visual impairment, hearing impairment, moderate COPD, frequent falls, history of diastolic heart failure.  There is no weight gain or weight loss, there is no fluid overload, patient has been kept on a conservative care.  No events or concerns otherwise, patient is calm and comfortable, required help and assist for ADLs, appetite has been fair and stable.  Patient is DNR.         Review of Systems   Constitutional:  Positive for fatigue. Negative for activity change.   HENT:  Positive for congestion, hearing loss and sore throat.    Eyes:  Positive for visual disturbance.   Respiratory:  Positive for cough and shortness of breath.    Cardiovascular: Negative.    Gastrointestinal: Negative.    Musculoskeletal:  Positive for arthralgias.   Skin: Negative.    Neurological: Negative.        Objective  /65   Pulse 67   Wt 76.7 kg (169 lb)   BMI 30.94 kg/m²     Physical Exam  Vitals and nursing note reviewed.   Constitutional:       Appearance: Normal appearance. She is normal weight.   HENT:      Head: Atraumatic.   Eyes:      Conjunctiva/sclera: Conjunctivae normal.   Cardiovascular:      Rate and Rhythm: Normal rate and regular rhythm.   Pulmonary:      Effort: Pulmonary effort is normal.      Breath sounds: Rales present.   Abdominal:      Palpations: Abdomen is soft.   Musculoskeletal:         General: Normal range of motion.      Cervical back: Normal range of motion.   Skin:     General: Skin is warm and dry.   Neurological:      General: No focal deficit present.   Psychiatric:         Mood and Affect: Mood is not anxious.    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  She remains euthyroid clinically and chemically, no bedsores, excellent nursing care has been provided, comfort care and supportive care to be continued.     Goals    None           Electronically Signed By: Jayesh Arzate MD   9/16/23  9:46 PM

## 2023-09-16 VITALS
BODY MASS INDEX: 30.94 KG/M2 | WEIGHT: 169 LBS | SYSTOLIC BLOOD PRESSURE: 135 MMHG | HEART RATE: 67 BPM | DIASTOLIC BLOOD PRESSURE: 65 MMHG

## 2023-09-16 ASSESSMENT — ENCOUNTER SYMPTOMS
ACTIVITY CHANGE: 0
SORE THROAT: 1
ARTHRALGIAS: 1
FATIGUE: 1
SHORTNESS OF BREATH: 1
COUGH: 1
NEUROLOGICAL NEGATIVE: 1
CARDIOVASCULAR NEGATIVE: 1
GASTROINTESTINAL NEGATIVE: 1

## 2023-09-17 NOTE — PROGRESS NOTES
Subjective   Patient ID: Naida Bruno is a 89 y.o. female who is long term resident being seen and evaluated for multiple medical problems.    Patient has been having sore throat, COVID-19 rapid antigen is negative.  Patient is 89-year-old female patient debilitated bedbound, visual impairment, hearing impairment, moderate COPD, frequent falls, history of diastolic heart failure.  There is no weight gain or weight loss, there is no fluid overload, patient has been kept on a conservative care.  No events or concerns otherwise, patient is calm and comfortable, required help and assist for ADLs, appetite has been fair and stable.  Patient is DNR.         Review of Systems   Constitutional:  Positive for fatigue. Negative for activity change.   HENT:  Positive for congestion, hearing loss and sore throat.    Eyes:  Positive for visual disturbance.   Respiratory:  Positive for cough and shortness of breath.    Cardiovascular: Negative.    Gastrointestinal: Negative.    Musculoskeletal:  Positive for arthralgias.   Skin: Negative.    Neurological: Negative.        Objective   /65   Pulse 67   Wt 76.7 kg (169 lb)   BMI 30.94 kg/m²     Physical Exam  Vitals and nursing note reviewed.   Constitutional:       Appearance: Normal appearance. She is normal weight.   HENT:      Head: Atraumatic.   Eyes:      Conjunctiva/sclera: Conjunctivae normal.   Cardiovascular:      Rate and Rhythm: Normal rate and regular rhythm.   Pulmonary:      Effort: Pulmonary effort is normal.      Breath sounds: Rales present.   Abdominal:      Palpations: Abdomen is soft.   Musculoskeletal:         General: Normal range of motion.      Cervical back: Normal range of motion.   Skin:     General: Skin is warm and dry.   Neurological:      General: No focal deficit present.   Psychiatric:         Mood and Affect: Mood is not anxious.    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  She remains euthyroid clinically and chemically, no bedsores, excellent nursing care has been provided, comfort care and supportive care to be continued.     Goals    None

## 2023-10-11 ENCOUNTER — NURSING HOME VISIT (OUTPATIENT)
Dept: POST ACUTE CARE | Facility: EXTERNAL LOCATION | Age: 88
End: 2023-10-11
Payer: COMMERCIAL

## 2023-10-11 DIAGNOSIS — E03.9 ACQUIRED HYPOTHYROIDISM: ICD-10-CM

## 2023-10-11 DIAGNOSIS — Z78.9 IMPAIRED ACTIVITIES OF DAILY LIVING: ICD-10-CM

## 2023-10-11 DIAGNOSIS — J44.9 CHRONIC OBSTRUCTIVE PULMONARY DISEASE, UNSPECIFIED COPD TYPE (MULTI): Primary | ICD-10-CM

## 2023-10-11 DIAGNOSIS — R54 AGE-RELATED PHYSICAL DEBILITY: ICD-10-CM

## 2023-10-11 DIAGNOSIS — I10 PRIMARY HYPERTENSION: ICD-10-CM

## 2023-10-11 PROCEDURE — 99308 SBSQ NF CARE LOW MDM 20: CPT | Performed by: INTERNAL MEDICINE

## 2023-10-11 NOTE — LETTER
Patient: Naida Bruno  : 1922    Encounter Date: 10/11/2023    Subjective  Patient ID: Naida Bruno is a 89 y.o. female who is long term resident being seen and evaluated for multiple medical problems.    89-year-old female patient doing well, remains on Lexapro and levothyroxine, vision remains impaired, appetite has been fair, no falls, essentially bedbound status.  No fever, no chills, no COVID-19, no confusion.  She is confined here for long-term care as previously she has a multiple falls send orthopedic injuries.  Stable pattern, progressive deterioration because of age-related physical and cognitive decline is expected.         Review of Systems   Constitutional:  Negative for appetite change and unexpected weight change.   Eyes:  Positive for visual disturbance.   Respiratory:  Negative for cough, choking and shortness of breath.    Cardiovascular:  Negative for leg swelling.   Gastrointestinal:  Negative for abdominal distention, abdominal pain and nausea.   Musculoskeletal:  Positive for gait problem.   Neurological:  Negative for dizziness.   Psychiatric/Behavioral:  Positive for confusion. Negative for behavioral problems. The patient is not nervous/anxious.        Objective  /65   Pulse 88   Wt 77.1 kg (170 lb)   BMI 31.12 kg/m²     Physical Exam  Vitals and nursing note reviewed.   Constitutional:       Appearance: Normal appearance. She is normal weight.   HENT:      Head: Atraumatic.   Eyes:      Conjunctiva/sclera: Conjunctivae normal.   Cardiovascular:      Rate and Rhythm: Normal rate and regular rhythm.   Pulmonary:      Effort: Pulmonary effort is normal.      Breath sounds: Rales present.   Abdominal:      Palpations: Abdomen is soft.   Musculoskeletal:         General: Normal range of motion.      Cervical back: Normal range of motion.   Skin:     General: Skin is warm and dry.   Neurological:      General: No focal deficit present.   Psychiatric:         Mood and  Affect: Mood is not anxious.    Assessment/Plan  Problem List Items Addressed This Visit             ICD-10-CM    Hypertension I10    Acquired hypothyroidism E03.9    Chronic obstructive pulmonary disease (CMS/[facility]) - Primary J44.9    Age-related physical debility R54     Other Visit Diagnoses         Codes    Impaired activities of daily living     Z78.9        ADLs remains impaired, mood affect and mentations remains unchanged, TSH has been checked infrequently, blood sugars 154, no weight fluctuations, appetite has been fair, no nosocomial infection.  Comfort care and supportive care to be continued, no events or concerns, excellent nursing care has been provided.  Patient remains DNR [facility].     Goals    None           Electronically Signed By: Jayesh Arzate MD   10/15/23  5:59 PM

## 2023-10-15 VITALS
BODY MASS INDEX: 31.12 KG/M2 | DIASTOLIC BLOOD PRESSURE: 65 MMHG | SYSTOLIC BLOOD PRESSURE: 134 MMHG | HEART RATE: 88 BPM | WEIGHT: 170 LBS

## 2023-10-15 ASSESSMENT — ENCOUNTER SYMPTOMS
SHORTNESS OF BREATH: 0
UNEXPECTED WEIGHT CHANGE: 0
ABDOMINAL PAIN: 0
NERVOUS/ANXIOUS: 0
ABDOMINAL DISTENTION: 0
APPETITE CHANGE: 0
DIZZINESS: 0
CONFUSION: 1
COUGH: 0
NAUSEA: 0
CHOKING: 0

## 2023-10-15 NOTE — PROGRESS NOTES
Subjective   Patient ID: Naida Bruno is a 89 y.o. female who is long term resident being seen and evaluated for multiple medical problems.    89-year-old female patient doing well, remains on Lexapro and levothyroxine, vision remains impaired, appetite has been fair, no falls, essentially bedbound status.  No fever, no chills, no COVID-19, no confusion.  She is confined here for long-term care as previously she has a multiple falls send orthopedic injuries.  Stable pattern, progressive deterioration because of age-related physical and cognitive decline is expected.         Review of Systems   Constitutional:  Negative for appetite change and unexpected weight change.   Eyes:  Positive for visual disturbance.   Respiratory:  Negative for cough, choking and shortness of breath.    Cardiovascular:  Negative for leg swelling.   Gastrointestinal:  Negative for abdominal distention, abdominal pain and nausea.   Musculoskeletal:  Positive for gait problem.   Neurological:  Negative for dizziness.   Psychiatric/Behavioral:  Positive for confusion. Negative for behavioral problems. The patient is not nervous/anxious.        Objective   /65   Pulse 88   Wt 77.1 kg (170 lb)   BMI 31.12 kg/m²     Physical Exam  Vitals and nursing note reviewed.   Constitutional:       Appearance: Normal appearance. She is normal weight.   HENT:      Head: Atraumatic.   Eyes:      Conjunctiva/sclera: Conjunctivae normal.   Cardiovascular:      Rate and Rhythm: Normal rate and regular rhythm.   Pulmonary:      Effort: Pulmonary effort is normal.      Breath sounds: Rales present.   Abdominal:      Palpations: Abdomen is soft.   Musculoskeletal:         General: Normal range of motion.      Cervical back: Normal range of motion.   Skin:     General: Skin is warm and dry.   Neurological:      General: No focal deficit present.   Psychiatric:         Mood and Affect: Mood is not anxious.    Assessment/Plan   Problem List Items  Addressed This Visit             ICD-10-CM    Hypertension I10    Acquired hypothyroidism E03.9    Chronic obstructive pulmonary disease (CMS/[facility]) - Primary J44.9    Age-related physical debility R54     Other Visit Diagnoses         Codes    Impaired activities of daily living     Z78.9        ADLs remains impaired, mood affect and mentations remains unchanged, TSH has been checked infrequently, blood sugars 154, no weight fluctuations, appetite has been fair, no nosocomial infection.  Comfort care and supportive care to be continued, no events or concerns, excellent nursing care has been provided.  Patient remains DNR [facility].     Goals    None

## 2023-11-15 ENCOUNTER — NURSING HOME VISIT (OUTPATIENT)
Dept: POST ACUTE CARE | Facility: EXTERNAL LOCATION | Age: 88
End: 2023-11-15
Payer: COMMERCIAL

## 2023-11-15 DIAGNOSIS — J44.9 CHRONIC OBSTRUCTIVE PULMONARY DISEASE, UNSPECIFIED COPD TYPE (MULTI): Primary | ICD-10-CM

## 2023-11-15 DIAGNOSIS — Z78.9 IMPAIRED ACTIVITIES OF DAILY LIVING: ICD-10-CM

## 2023-11-15 DIAGNOSIS — R05.3 CHRONIC COUGH: ICD-10-CM

## 2023-11-15 DIAGNOSIS — R53.81 PHYSICAL DECONDITIONING: ICD-10-CM

## 2023-11-15 DIAGNOSIS — E03.9 ACQUIRED HYPOTHYROIDISM: ICD-10-CM

## 2023-11-15 NOTE — LETTER
Patient: Naida Bruno  : 1922    Encounter Date: 11/15/2023    Subjective  Patient ID: Naida Bruno is a 89 y.o. female who is long term resident being seen and evaluated for multiple medical problems.    Nursing staff states that patient has been having frequent spells of cough, that may not be unusual for 89-year-old female patient who could not be able to clear secretions from airways.  Today when I saw this patient she was sitting upright, she has a legal blindness, she was not having any complaints, she just cannot do anything, she kept on falling at home, she is confined here for long-term care for several months, last time she was having some respiratory infection which has been subsided.  Patient remains on scant therapeutics, denies any pain or discomfort, does not have any fall or traumatic injury or any serious nosocomial infections or any ER visit.         Review of Systems   Constitutional:  Negative for activity change and appetite change.   Eyes:  Positive for visual disturbance.   Respiratory:  Positive for cough and shortness of breath. Negative for apnea and choking.    Cardiovascular:  Negative for leg swelling.   Gastrointestinal:  Negative for abdominal distention, abdominal pain and nausea.   Musculoskeletal:  Positive for arthralgias.   Neurological:  Positive for weakness.       Objective  /64   Pulse 75   Wt 77.6 kg (171 lb)   BMI 31.31 kg/m²     Physical Exam  Vitals and nursing note reviewed.   Constitutional:       Appearance: Normal appearance. She is normal weight.   HENT:      Head: Atraumatic.   Eyes:      Conjunctiva/sclera: Conjunctivae normal.   Cardiovascular:      Rate and Rhythm: Normal rate and regular rhythm.   Pulmonary:      Effort: Pulmonary effort is normal.      Breath sounds: Rales present.   Abdominal:      Palpations: Abdomen is soft.   Musculoskeletal:         General: Normal range of motion.      Cervical back: Normal range of motion.    Skin:     General: Skin is warm and dry.   Neurological:      General: No focal deficit present.   Psychiatric:         Mood and Affect: Mood is not anxious.  Assessment/Plan  Problem List Items Addressed This Visit             ICD-10-CM    Physical deconditioning R53.81    Acquired hypothyroidism E03.9    Chronic obstructive pulmonary disease (CMS/HCC) - Primary J44.9     Other Visit Diagnoses         Codes    Impaired activities of daily living     Z78.9    Chronic cough     R05.3        Nothing can be done for cough it could be symptomatic ally treated with guaifenesin, nothing can be done to improve patient's functional status, nursing staff has been providing excellent care, anxiety is controlled, daily routine has been well kept, appetite has been fluctuating, assisted for all kind of care and feeding, assisted for all ADLs.  We are not doing any laboratories, patient is DNR [facility], nursing staff will try their best to keep this patient assisted and helped and also excellent nursing care has been provided, prognosis remains poor.     Goals    None           Electronically Signed By: Jayesh Arzate MD   11/18/23  7:51 PM

## 2023-11-18 VITALS
WEIGHT: 171 LBS | SYSTOLIC BLOOD PRESSURE: 145 MMHG | BODY MASS INDEX: 31.31 KG/M2 | DIASTOLIC BLOOD PRESSURE: 64 MMHG | HEART RATE: 75 BPM

## 2023-11-18 ASSESSMENT — ENCOUNTER SYMPTOMS
ABDOMINAL DISTENTION: 0
NAUSEA: 0
ABDOMINAL PAIN: 0
ARTHRALGIAS: 1
ACTIVITY CHANGE: 0
COUGH: 1
CHOKING: 0
SHORTNESS OF BREATH: 1
APPETITE CHANGE: 0
WEAKNESS: 1
APNEA: 0

## 2023-11-19 NOTE — PROGRESS NOTES
Subjective   Patient ID: Naida Bruno is a 89 y.o. female who is long term resident being seen and evaluated for multiple medical problems.    Nursing staff states that patient has been having frequent spells of cough, that may not be unusual for 89-year-old female patient who could not be able to clear secretions from airways.  Today when I saw this patient she was sitting upright, she has a legal blindness, she was not having any complaints, she just cannot do anything, she kept on falling at home, she is confined here for long-term care for several months, last time she was having some respiratory infection which has been subsided.  Patient remains on scant therapeutics, denies any pain or discomfort, does not have any fall or traumatic injury or any serious nosocomial infections or any ER visit.         Review of Systems   Constitutional:  Negative for activity change and appetite change.   Eyes:  Positive for visual disturbance.   Respiratory:  Positive for cough and shortness of breath. Negative for apnea and choking.    Cardiovascular:  Negative for leg swelling.   Gastrointestinal:  Negative for abdominal distention, abdominal pain and nausea.   Musculoskeletal:  Positive for arthralgias.   Neurological:  Positive for weakness.       Objective   /64   Pulse 75   Wt 77.6 kg (171 lb)   BMI 31.31 kg/m²     Physical Exam  Vitals and nursing note reviewed.   Constitutional:       Appearance: Normal appearance. She is normal weight.   HENT:      Head: Atraumatic.   Eyes:      Conjunctiva/sclera: Conjunctivae normal.   Cardiovascular:      Rate and Rhythm: Normal rate and regular rhythm.   Pulmonary:      Effort: Pulmonary effort is normal.      Breath sounds: Rales present.   Abdominal:      Palpations: Abdomen is soft.   Musculoskeletal:         General: Normal range of motion.      Cervical back: Normal range of motion.   Skin:     General: Skin is warm and dry.   Neurological:      General: No  focal deficit present.   Psychiatric:         Mood and Affect: Mood is not anxious.  Assessment/Plan   Problem List Items Addressed This Visit             ICD-10-CM    Physical deconditioning R53.81    Acquired hypothyroidism E03.9    Chronic obstructive pulmonary disease (CMS/[facility]) - Primary J44.9     Other Visit Diagnoses         Codes    Impaired activities of daily living     Z78.9    Chronic cough     R05.3        Nothing can be done for cough it could be symptomatic ally treated with guaifenesin, nothing can be done to improve patient's functional status, nursing staff has been providing excellent care, anxiety is controlled, daily routine has been well kept, appetite has been fluctuating, assisted for all kind of care and feeding, assisted for all ADLs.  We are not doing any laboratories, patient is DNR [facility], nursing staff will try their best to keep this patient assisted and helped and also excellent nursing care has been provided, prognosis remains poor.     Goals    None

## 2023-12-13 ENCOUNTER — NURSING HOME VISIT (OUTPATIENT)
Dept: POST ACUTE CARE | Facility: EXTERNAL LOCATION | Age: 88
End: 2023-12-13
Payer: MEDICARE

## 2023-12-13 DIAGNOSIS — R54 AGE-RELATED PHYSICAL DEBILITY: ICD-10-CM

## 2023-12-13 DIAGNOSIS — E03.9 ACQUIRED HYPOTHYROIDISM: ICD-10-CM

## 2023-12-13 DIAGNOSIS — J44.9 CHRONIC OBSTRUCTIVE PULMONARY DISEASE, UNSPECIFIED COPD TYPE (MULTI): Primary | ICD-10-CM

## 2023-12-13 DIAGNOSIS — R53.81 PHYSICAL DECONDITIONING: ICD-10-CM

## 2023-12-13 PROCEDURE — 99308 SBSQ NF CARE LOW MDM 20: CPT | Performed by: INTERNAL MEDICINE

## 2023-12-13 NOTE — LETTER
Patient: Naida Bruno  : 1922    Encounter Date: 2023    Subjective  Patient ID: Naida Bruno is a 89 y.o. female who is long term resident being seen and evaluated for multiple medical problems.    Patient was seen for follow-up for routine care visit, today I see that patient is sitting upright, patient is legally blind, patient is unable to comprehend, daily routine is unchanged, required a lot of help and assist, assisted for all ADLs including feeding, no infection, no fever, no fluctuation in the weight, no behavioral disturbance, no fall or no ER visit.  Comfort measure and supportive care has been continued and maintained.  Nursing staff did not have any questions or concerns.         Review of Systems   Constitutional:  Negative for activity change.   Respiratory:  Negative for cough, choking and shortness of breath.    Cardiovascular:  Negative for leg swelling.   Gastrointestinal:  Negative for abdominal pain and nausea.   Musculoskeletal:  Positive for arthralgias.   Neurological: Negative.  Negative for weakness.   Psychiatric/Behavioral:  Positive for confusion. Negative for behavioral problems.        Objective  /65   Pulse 76   Wt 74.8 kg (165 lb)   BMI 30.21 kg/m²     Physical Exam  Vitals and nursing note reviewed.   Constitutional:       Appearance: Normal appearance. She is normal weight.   HENT:      Head: Atraumatic.   Eyes:      Conjunctiva/sclera: Conjunctivae normal.   Cardiovascular:      Rate and Rhythm: Normal rate and regular rhythm.   Pulmonary:      Effort: Pulmonary effort is normal.      Breath sounds: Rales present.   Abdominal:      Palpations: Abdomen is soft.   Musculoskeletal:         General: Normal range of motion.      Cervical back: Normal range of motion.   Skin:     General: Skin is warm and dry.   Neurological:      General: No focal deficit present.   Psychiatric:         Mood and Affect: Mood is not anxious.    Assessment/Plan  Problem  List Items Addressed This Visit             ICD-10-CM    Physical deconditioning R53.81    Acquired hypothyroidism E03.9    Chronic obstructive pulmonary disease (CMS/[facility]) - Primary J44.9    Age-related physical debility R54   This patient was seen for my regular monthly visit, nursing evaluations and nursing notes were reviewed, interim events are reviewed, interim concerns and messages were reviewed as we have communicated with nursing staff.  Any issues with the falls, skin care impairment, declining physical condition are reviewed and noted, diagnosis list were reviewed, list of medications were reviewed, living will related issues were reviewed, overall patient has been doing well, any declining in patient's condition or any change in patient's condition needs to be notified to physician promptly, discussed with nursing staff, if needed would communicate with family.  Patient stays confined here at the facility for long-term care, there are always concerns about long-term care related issues and concerns.  Nursing staff is trying their best to keep patient safe, all sort of measures has been taken to keep patient safe and comfortable.  At the age of 89 conservative care, supportive care, antidepressant, pain control, assist with ADLs is what we need to.  Prognosis remains guarded but stable condition and blood sugar was reported to be 154.     Goals    None           Electronically Signed By: Jayesh Arzate MD   12/17/23  6:28 PM

## 2023-12-17 VITALS
WEIGHT: 165 LBS | HEART RATE: 76 BPM | BODY MASS INDEX: 30.21 KG/M2 | DIASTOLIC BLOOD PRESSURE: 65 MMHG | SYSTOLIC BLOOD PRESSURE: 145 MMHG

## 2023-12-17 ASSESSMENT — ENCOUNTER SYMPTOMS
SHORTNESS OF BREATH: 0
CHOKING: 0
CONFUSION: 1
NAUSEA: 0
NEUROLOGICAL NEGATIVE: 1
ACTIVITY CHANGE: 0
ABDOMINAL PAIN: 0
WEAKNESS: 0
ARTHRALGIAS: 1
COUGH: 0

## 2023-12-17 NOTE — PROGRESS NOTES
Subjective   Patient ID: Naida Bruno is a 89 y.o. female who is long term resident being seen and evaluated for multiple medical problems.    Patient was seen for follow-up for routine care visit, today I see that patient is sitting upright, patient is legally blind, patient is unable to comprehend, daily routine is unchanged, required a lot of help and assist, assisted for all ADLs including feeding, no infection, no fever, no fluctuation in the weight, no behavioral disturbance, no fall or no ER visit.  Comfort measure and supportive care has been continued and maintained.  Nursing staff did not have any questions or concerns.         Review of Systems   Constitutional:  Negative for activity change.   Respiratory:  Negative for cough, choking and shortness of breath.    Cardiovascular:  Negative for leg swelling.   Gastrointestinal:  Negative for abdominal pain and nausea.   Musculoskeletal:  Positive for arthralgias.   Neurological: Negative.  Negative for weakness.   Psychiatric/Behavioral:  Positive for confusion. Negative for behavioral problems.        Objective   /65   Pulse 76   Wt 74.8 kg (165 lb)   BMI 30.21 kg/m²     Physical Exam  Vitals and nursing note reviewed.   Constitutional:       Appearance: Normal appearance. She is normal weight.   HENT:      Head: Atraumatic.   Eyes:      Conjunctiva/sclera: Conjunctivae normal.   Cardiovascular:      Rate and Rhythm: Normal rate and regular rhythm.   Pulmonary:      Effort: Pulmonary effort is normal.      Breath sounds: Rales present.   Abdominal:      Palpations: Abdomen is soft.   Musculoskeletal:         General: Normal range of motion.      Cervical back: Normal range of motion.   Skin:     General: Skin is warm and dry.   Neurological:      General: No focal deficit present.   Psychiatric:         Mood and Affect: Mood is not anxious.    Assessment/Plan   Problem List Items Addressed This Visit             ICD-10-CM    Physical  deconditioning R53.81    Acquired hypothyroidism E03.9    Chronic obstructive pulmonary disease (CMS/[facility]) - Primary J44.9    Age-related physical debility R54   This patient was seen for my regular monthly visit, nursing evaluations and nursing notes were reviewed, interim events are reviewed, interim concerns and messages were reviewed as we have communicated with nursing staff.  Any issues with the falls, skin care impairment, declining physical condition are reviewed and noted, diagnosis list were reviewed, list of medications were reviewed, living will related issues were reviewed, overall patient has been doing well, any declining in patient's condition or any change in patient's condition needs to be notified to physician promptly, discussed with nursing staff, if needed would communicate with family.  Patient stays confined here at the facility for long-term care, there are always concerns about long-term care related issues and concerns.  Nursing staff is trying their best to keep patient safe, all sort of measures has been taken to keep patient safe and comfortable.  At the age of 89 conservative care, supportive care, antidepressant, pain control, assist with ADLs is what we need to.  Prognosis remains guarded but stable condition and blood sugar was reported to be 154.     Goals    None

## 2024-01-10 ENCOUNTER — NURSING HOME VISIT (OUTPATIENT)
Dept: POST ACUTE CARE | Facility: EXTERNAL LOCATION | Age: 89
End: 2024-01-10
Payer: MEDICARE

## 2024-01-10 DIAGNOSIS — I10 PRIMARY HYPERTENSION: ICD-10-CM

## 2024-01-10 DIAGNOSIS — J44.9 CHRONIC OBSTRUCTIVE PULMONARY DISEASE, UNSPECIFIED COPD TYPE (MULTI): ICD-10-CM

## 2024-01-10 DIAGNOSIS — I50.32 CHRONIC DIASTOLIC HEART FAILURE (MULTI): ICD-10-CM

## 2024-01-10 DIAGNOSIS — J96.11 CHRONIC RESPIRATORY FAILURE WITH HYPOXIA (MULTI): ICD-10-CM

## 2024-01-10 DIAGNOSIS — L89.313 PRESSURE ULCER OF RIGHT BUTTOCK, STAGE 3 (MULTI): Primary | ICD-10-CM

## 2024-01-10 PROCEDURE — 99308 SBSQ NF CARE LOW MDM 20: CPT | Performed by: INTERNAL MEDICINE

## 2024-01-10 NOTE — LETTER
Patient: Naida Bruno  : 1922    Encounter Date: 01/10/2024    Subjective  Patient ID: Naida Bruno is a 89 y.o. female who is long term resident being seen and evaluated for multiple medical problems.    Patient was seen for routine monthly care visit, nothing new is happening, 89-year-old female patient confined here for more than 6 months after having multiple falls and the fractures and unable to be staying independently.  She is legally blind, no behavioral disturbance, no significant fluctuations in the weight, appetite has been fair, no nosocomial infection, no viral infection, patient remains on Lexapro antihypertensive and routine pain medications, daily routine has been reviewed, assisted for all sort of ADLs.  Nursing staff did not have any questions or concerns.         Review of Systems  Constitutional:  Negative for activity change.   Respiratory:  Negative for cough, choking and shortness of breath.    Cardiovascular:  Negative for leg swelling.   Gastrointestinal:  Negative for abdominal pain and nausea.   Musculoskeletal:  Positive for arthralgias.   Neurological: Negative.  Negative for weakness.   Psychiatric/Behavioral:  Positive for confusion.   Objective  /64   Pulse 74   Wt 72.6 kg (160 lb)   BMI 29.29 kg/m²     Physical Exam  Vitals and nursing note reviewed.   Constitutional:       Appearance: Normal appearance. She is normal weight.   HENT:      Head: Atraumatic.   Eyes:      Conjunctiva/sclera: Conjunctivae normal.   Cardiovascular:      Rate and Rhythm: Normal rate and regular rhythm.   Pulmonary:      Effort: Pulmonary effort is normal.      Breath sounds: Rales present.   Abdominal:      Palpations: Abdomen is soft.   Musculoskeletal:         General: Normal range of motion.      Cervical back: Normal range of motion.   Skin:     General: Skin is warm and dry.   Neurological:      General: No focal deficit present.   Psychiatric:         Mood and Affect: Mood  is not anxious.  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  COPD was moderate in severity, no breathing compromise, continue medications, supportive care, comfort measures, laboratories has not not been done, patient is susceptible for complications, patient could be susceptible for sudden demise.  Age-related physical decline and cognitive decline is expected.  Prognosis is guarded, DNR is present.     Goals    None           Electronically Signed By: Jayesh Arzate MD   1/11/24  9:10 PM

## 2024-01-11 VITALS
WEIGHT: 160 LBS | BODY MASS INDEX: 29.29 KG/M2 | SYSTOLIC BLOOD PRESSURE: 124 MMHG | DIASTOLIC BLOOD PRESSURE: 64 MMHG | HEART RATE: 74 BPM

## 2024-01-11 PROBLEM — L89.313 PRESSURE ULCER OF RIGHT BUTTOCK, STAGE 3 (MULTI): Status: ACTIVE | Noted: 2024-01-11

## 2024-01-11 PROBLEM — J96.11 CHRONIC RESPIRATORY FAILURE WITH HYPOXIA (MULTI): Status: ACTIVE | Noted: 2024-01-11

## 2024-01-12 NOTE — PROGRESS NOTES
Subjective   Patient ID: Naida Bruno is a 89 y.o. female who is long term resident being seen and evaluated for multiple medical problems.    Patient was seen for routine monthly care visit, nothing new is happening, 89-year-old female patient confined here for more than 6 months after having multiple falls and the fractures and unable to be staying independently.  She is legally blind, no behavioral disturbance, no significant fluctuations in the weight, appetite has been fair, no nosocomial infection, no viral infection, patient remains on Lexapro antihypertensive and routine pain medications, daily routine has been reviewed, assisted for all sort of ADLs.  Nursing staff did not have any questions or concerns.         Review of Systems  Constitutional:  Negative for activity change.   Respiratory:  Negative for cough, choking and shortness of breath.    Cardiovascular:  Negative for leg swelling.   Gastrointestinal:  Negative for abdominal pain and nausea.   Musculoskeletal:  Positive for arthralgias.   Neurological: Negative.  Negative for weakness.   Psychiatric/Behavioral:  Positive for confusion.   Objective   /64   Pulse 74   Wt 72.6 kg (160 lb)   BMI 29.29 kg/m²     Physical Exam  Vitals and nursing note reviewed.   Constitutional:       Appearance: Normal appearance. She is normal weight.   HENT:      Head: Atraumatic.   Eyes:      Conjunctiva/sclera: Conjunctivae normal.   Cardiovascular:      Rate and Rhythm: Normal rate and regular rhythm.   Pulmonary:      Effort: Pulmonary effort is normal.      Breath sounds: Rales present.   Abdominal:      Palpations: Abdomen is soft.   Musculoskeletal:         General: Normal range of motion.      Cervical back: Normal range of motion.   Skin:     General: Skin is warm and dry.   Neurological:      General: No focal deficit present.   Psychiatric:         Mood and Affect: Mood is not anxious.  Assessment/Plan   Problem List Items Addressed This  Visit             ICD-10-CM    Hypertension I10    Chronic diastolic heart failure (CMS/[facility]) I50.32    Chronic obstructive pulmonary disease (CMS/[facility]) J44.9    Pressure ulcer of right buttock, stage 3 (CMS/[facility]) - Primary L89.313    Chronic respiratory failure with hypoxia (CMS/[facility]) J96.11   Patient has a pressure ulcer on the right buttocks staging is variable it is chronic, patient has history of diastolic heart failure it is not the issue, patient has history of respiratory failure with hypoxemia intermittent oxygen is required, BP readings are stable.  COPD was moderate in severity, no breathing compromise, continue medications, supportive care, comfort measures, laboratories has not not been done, patient is susceptible for complications, patient could be susceptible for sudden demise.  Age-related physical decline and cognitive decline is expected.  Prognosis is guarded, DNR is present.     Goals    None

## 2024-02-14 ENCOUNTER — NURSING HOME VISIT (OUTPATIENT)
Dept: POST ACUTE CARE | Facility: EXTERNAL LOCATION | Age: 89
End: 2024-02-14
Payer: MEDICARE

## 2024-02-14 DIAGNOSIS — E03.9 ACQUIRED HYPOTHYROIDISM: ICD-10-CM

## 2024-02-14 DIAGNOSIS — I50.32 CHRONIC DIASTOLIC HEART FAILURE (MULTI): ICD-10-CM

## 2024-02-14 DIAGNOSIS — I10 PRIMARY HYPERTENSION: Primary | ICD-10-CM

## 2024-02-14 DIAGNOSIS — R54 AGE-RELATED PHYSICAL DEBILITY: ICD-10-CM

## 2024-02-14 DIAGNOSIS — J44.9 CHRONIC OBSTRUCTIVE PULMONARY DISEASE, UNSPECIFIED COPD TYPE (MULTI): ICD-10-CM

## 2024-02-14 PROCEDURE — 99308 SBSQ NF CARE LOW MDM 20: CPT | Performed by: INTERNAL MEDICINE

## 2024-02-14 NOTE — LETTER
Patient: Naida Bruno  : 1922    Encounter Date: 2024    Subjective  Patient ID: Naida Bruno is a 89 y.o. female who is long term resident being seen and evaluated for multiple medical problems.    Patient continued to remain bedbound, no events or concerns, there is a weight gain.  Patient has a legal blindness, patient does not have any acute ailment, infectious episode, does not have any ER visits or hospitalization, patient remains DNR.  Patient remains on levothyroxine and escitalopram, does not require nebulizer, remains on oxygen, patient is legally blind, nursing staff did not have any questions or concerns, comfort care, supportive care, conservative care has been given at the age of 89 years.  Patient has a multiple falls, multiple orthopedic injuries, patient is unable to sustain any ADLs.         Review of Systems   Constitutional:  Positive for fatigue. Negative for activity change.   HENT:  Negative for congestion.    Eyes:  Positive for visual disturbance.   Respiratory:  Negative for apnea, cough and shortness of breath.    Cardiovascular:  Negative for leg swelling.   Gastrointestinal:  Positive for constipation. Negative for nausea.   Genitourinary:  Negative for difficulty urinating.   Musculoskeletal:  Positive for arthralgias and gait problem.   Skin:  Negative for wound.   Neurological:  Positive for weakness.   Psychiatric/Behavioral:  Positive for confusion. Negative for agitation.        Objective  /64   Pulse 74   Wt 82.1 kg (181 lb)   BMI 33.14 kg/m²     Physical Exam  Vitals and nursing note reviewed.   Constitutional:       Appearance: Normal appearance. She is normal weight.   HENT:      Head: Atraumatic.   Eyes:      Conjunctiva/sclera: Conjunctivae normal.   Cardiovascular:      Rate and Rhythm: Normal rate and regular rhythm.   Pulmonary:      Effort: Pulmonary effort is normal.      Breath sounds: Rales present.   Abdominal:      Palpations: Abdomen  is soft.   Musculoskeletal:         General: Normal range of motion.      Cervical back: Normal range of motion.   Skin:     General: Skin is warm and dry.   Neurological:      General: No focal deficit present.   Assessment/Plan  Problem List Items Addressed This Visit             ICD-10-CM    Hypertension - Primary I10    Chronic diastolic heart failure (CMS/[facility]) I50.32    Acquired hypothyroidism E03.9    Chronic obstructive pulmonary disease (CMS/[facility]) J44.9    Age-related physical debility R54   Patient continued to have her age-related debility, vision remains impaired, BP readings are stable, no obvious heart failure heart failure is in remission.  COPD is moderate, no lethargy, no respiratory distress.  We are not doing laboratories on a regular basis, nursing staff did not have any questions or concerns, continue medications as listed, supportive care comfort measures and symptomatic management is what we are going to offer to this patient and any sort of acute treatment will not be encouraged.  Prognosis remains guarded.     Goals    None           Electronically Signed By: Jayesh Arzate MD   2/16/24  9:04 PM

## 2024-02-16 VITALS
SYSTOLIC BLOOD PRESSURE: 124 MMHG | HEART RATE: 74 BPM | BODY MASS INDEX: 33.14 KG/M2 | WEIGHT: 181 LBS | DIASTOLIC BLOOD PRESSURE: 64 MMHG

## 2024-02-16 ASSESSMENT — ENCOUNTER SYMPTOMS
APNEA: 0
SHORTNESS OF BREATH: 0
ARTHRALGIAS: 1
WOUND: 0
CONFUSION: 1
FATIGUE: 1
COUGH: 0
NAUSEA: 0
AGITATION: 0
ACTIVITY CHANGE: 0
DIFFICULTY URINATING: 0
CONSTIPATION: 1
WEAKNESS: 1

## 2024-02-17 NOTE — PROGRESS NOTES
Subjective   Patient ID: Naida Bruno is a 89 y.o. female who is long term resident being seen and evaluated for multiple medical problems.    Patient continued to remain bedbound, no events or concerns, there is a weight gain.  Patient has a legal blindness, patient does not have any acute ailment, infectious episode, does not have any ER visits or hospitalization, patient remains DNR.  Patient remains on levothyroxine and escitalopram, does not require nebulizer, remains on oxygen, patient is legally blind, nursing staff did not have any questions or concerns, comfort care, supportive care, conservative care has been given at the age of 89 years.  Patient has a multiple falls, multiple orthopedic injuries, patient is unable to sustain any ADLs.         Review of Systems   Constitutional:  Positive for fatigue. Negative for activity change.   HENT:  Negative for congestion.    Eyes:  Positive for visual disturbance.   Respiratory:  Negative for apnea, cough and shortness of breath.    Cardiovascular:  Negative for leg swelling.   Gastrointestinal:  Positive for constipation. Negative for nausea.   Genitourinary:  Negative for difficulty urinating.   Musculoskeletal:  Positive for arthralgias and gait problem.   Skin:  Negative for wound.   Neurological:  Positive for weakness.   Psychiatric/Behavioral:  Positive for confusion. Negative for agitation.        Objective   /64   Pulse 74   Wt 82.1 kg (181 lb)   BMI 33.14 kg/m²     Physical Exam  Vitals and nursing note reviewed.   Constitutional:       Appearance: Normal appearance. She is normal weight.   HENT:      Head: Atraumatic.   Eyes:      Conjunctiva/sclera: Conjunctivae normal.   Cardiovascular:      Rate and Rhythm: Normal rate and regular rhythm.   Pulmonary:      Effort: Pulmonary effort is normal.      Breath sounds: Rales present.   Abdominal:      Palpations: Abdomen is soft.   Musculoskeletal:         General: Normal range of motion.       Cervical back: Normal range of motion.   Skin:     General: Skin is warm and dry.   Neurological:      General: No focal deficit present.   Assessment/Plan   Problem List Items Addressed This Visit             ICD-10-CM    Hypertension - Primary I10    Chronic diastolic heart failure (CMS/[facility]) I50.32    Acquired hypothyroidism E03.9    Chronic obstructive pulmonary disease (CMS/[facility]) J44.9    Age-related physical debility R54   Patient continued to have her age-related debility, vision remains impaired, BP readings are stable, no obvious heart failure heart failure is in remission.  COPD is moderate, no lethargy, no respiratory distress.  We are not doing laboratories on a regular basis, nursing staff did not have any questions or concerns, continue medications as listed, supportive care comfort measures and symptomatic management is what we are going to offer to this patient and any sort of acute treatment will not be encouraged.  Prognosis remains guarded.     Goals    None

## 2024-03-13 ENCOUNTER — NURSING HOME VISIT (OUTPATIENT)
Dept: POST ACUTE CARE | Facility: EXTERNAL LOCATION | Age: 89
End: 2024-03-13
Payer: MEDICARE

## 2024-03-13 DIAGNOSIS — J44.9 CHRONIC OBSTRUCTIVE PULMONARY DISEASE, UNSPECIFIED COPD TYPE (MULTI): ICD-10-CM

## 2024-03-13 DIAGNOSIS — R29.6 FALLS FREQUENTLY: ICD-10-CM

## 2024-03-13 DIAGNOSIS — E03.9 ACQUIRED HYPOTHYROIDISM: ICD-10-CM

## 2024-03-13 DIAGNOSIS — I10 PRIMARY HYPERTENSION: Primary | ICD-10-CM

## 2024-03-13 PROCEDURE — 99308 SBSQ NF CARE LOW MDM 20: CPT | Performed by: INTERNAL MEDICINE

## 2024-03-13 NOTE — LETTER
Patient: Naida Bruno  : 1922    Encounter Date: 2024    Subjective  Patient ID: Naida Bruno is a 89 y.o. female who is long term resident being seen and evaluated for multiple medical problems.    89-year-old female patient was seen for routine visit.  She continues to have a weight gain, she came with weight of 160 pounds and now almost 180 pounds.  She is bedbound, she is legally blind, she has a COPD, she has a frequent falls, she has limited mobility, she does not have any jitteriness or lethargic, no nosocomial infection, she has been assisted for daily care and activities.  Nursing staff did not have any questions or concerns, daily routine has been well-kept and maintained.         Review of Systems   Constitutional:  Negative for activity change and chills.   HENT:  Negative for congestion and rhinorrhea.    Eyes:  Positive for visual disturbance.   Respiratory:  Negative for apnea, cough, choking and shortness of breath.    Cardiovascular:  Negative for chest pain.   Gastrointestinal:  Positive for constipation. Negative for abdominal distention, diarrhea and nausea.   Musculoskeletal:  Positive for arthralgias and gait problem.   Skin: Negative.    Neurological:  Positive for weakness.   Psychiatric/Behavioral:  Negative for agitation, behavioral problems and confusion.        Objective  /64   Pulse 74   Wt 81.2 kg (179 lb)   BMI 32.77 kg/m²     Physical Exam  Vitals and nursing note reviewed.   Constitutional:       Appearance: Normal appearance. She is normal weight.   HENT:      Head: Atraumatic.   Eyes:      Conjunctiva/sclera: Conjunctivae normal.   Cardiovascular:      Rate and Rhythm: Normal rate and regular rhythm.   Pulmonary:      Effort: Pulmonary effort is normal.      Breath sounds: Rales present.   Abdominal:      Palpations: Abdomen is soft.   Musculoskeletal:         General: Normal range of motion.      Cervical back: Normal range of motion.   Skin:      General: Skin is warm and dry.   Neurological:      General: No focal deficit present.   Psychiatric:         Mood and Affect: Mood is not anxious.  Assessment/Plan  Problem List Items Addressed This Visit             ICD-10-CM    Hypertension - Primary I10    Acquired hypothyroidism E03.9    Chronic obstructive pulmonary disease (CMS/[facility]) J44.9     Other Visit Diagnoses         Codes    Falls frequently     R29.6        Patient used to fall frequently before confinement here at the facility.  Now essentially bedbound, now patient is essentially limited to her confinement.  Excellent nursing care has been provided, no breathing compromise, does not require nasal oxygen.  BP readings are stable, no heart failure, no fluid overload, remains euthyroid clinically and chemically, patient has history of COPD.  Continue medications as listed without any change or alteration with DNR noticed and long-term prognosis remains poor because of age-related physical debility.     Goals    None           Electronically Signed By: Jayesh Arzate MD   3/14/24 10:09 PM

## 2024-03-14 VITALS
SYSTOLIC BLOOD PRESSURE: 124 MMHG | DIASTOLIC BLOOD PRESSURE: 64 MMHG | BODY MASS INDEX: 32.77 KG/M2 | HEART RATE: 74 BPM | WEIGHT: 179 LBS

## 2024-03-14 ASSESSMENT — ENCOUNTER SYMPTOMS
WEAKNESS: 1
CONSTIPATION: 1
ARTHRALGIAS: 1
COUGH: 0
CHILLS: 0
ABDOMINAL DISTENTION: 0
SHORTNESS OF BREATH: 0
NAUSEA: 0
RHINORRHEA: 0
CHOKING: 0
DIARRHEA: 0
ACTIVITY CHANGE: 0
CONFUSION: 0
APNEA: 0
AGITATION: 0

## 2024-03-15 NOTE — PROGRESS NOTES
Subjective   Patient ID: Naida Bruno is a 89 y.o. female who is long term resident being seen and evaluated for multiple medical problems.    89-year-old female patient was seen for routine visit.  She continues to have a weight gain, she came with weight of 160 pounds and now almost 180 pounds.  She is bedbound, she is legally blind, she has a COPD, she has a frequent falls, she has limited mobility, she does not have any jitteriness or lethargic, no nosocomial infection, she has been assisted for daily care and activities.  Nursing staff did not have any questions or concerns, daily routine has been well-kept and maintained.         Review of Systems   Constitutional:  Negative for activity change and chills.   HENT:  Negative for congestion and rhinorrhea.    Eyes:  Positive for visual disturbance.   Respiratory:  Negative for apnea, cough, choking and shortness of breath.    Cardiovascular:  Negative for chest pain.   Gastrointestinal:  Positive for constipation. Negative for abdominal distention, diarrhea and nausea.   Musculoskeletal:  Positive for arthralgias and gait problem.   Skin: Negative.    Neurological:  Positive for weakness.   Psychiatric/Behavioral:  Negative for agitation, behavioral problems and confusion.        Objective   /64   Pulse 74   Wt 81.2 kg (179 lb)   BMI 32.77 kg/m²     Physical Exam  Vitals and nursing note reviewed.   Constitutional:       Appearance: Normal appearance. She is normal weight.   HENT:      Head: Atraumatic.   Eyes:      Conjunctiva/sclera: Conjunctivae normal.   Cardiovascular:      Rate and Rhythm: Normal rate and regular rhythm.   Pulmonary:      Effort: Pulmonary effort is normal.      Breath sounds: Rales present.   Abdominal:      Palpations: Abdomen is soft.   Musculoskeletal:         General: Normal range of motion.      Cervical back: Normal range of motion.   Skin:     General: Skin is warm and dry.   Neurological:      General: No focal  deficit present.   Psychiatric:         Mood and Affect: Mood is not anxious.  Assessment/Plan   Problem List Items Addressed This Visit             ICD-10-CM    Hypertension - Primary I10    Acquired hypothyroidism E03.9    Chronic obstructive pulmonary disease (CMS/[facility]) J44.9     Other Visit Diagnoses         Codes    Falls frequently     R29.6        Patient used to fall frequently before confinement here at the facility.  Now essentially bedbound, now patient is essentially limited to her confinement.  Excellent nursing care has been provided, no breathing compromise, does not require nasal oxygen.  BP readings are stable, no heart failure, no fluid overload, remains euthyroid clinically and chemically, patient has history of COPD.  Continue medications as listed without any change or alteration with DNR noticed and long-term prognosis remains poor because of age-related physical debility.     Goals    None

## 2024-04-10 ENCOUNTER — NURSING HOME VISIT (OUTPATIENT)
Dept: POST ACUTE CARE | Facility: EXTERNAL LOCATION | Age: 89
End: 2024-04-10
Payer: MEDICARE

## 2024-04-10 DIAGNOSIS — I50.32 CHRONIC DIASTOLIC HEART FAILURE (MULTI): ICD-10-CM

## 2024-04-10 DIAGNOSIS — I10 PRIMARY HYPERTENSION: Primary | ICD-10-CM

## 2024-04-10 DIAGNOSIS — J44.9 CHRONIC OBSTRUCTIVE PULMONARY DISEASE, UNSPECIFIED COPD TYPE (MULTI): ICD-10-CM

## 2024-04-10 DIAGNOSIS — E03.9 ACQUIRED HYPOTHYROIDISM: ICD-10-CM

## 2024-04-10 DIAGNOSIS — R54 AGE-RELATED PHYSICAL DEBILITY: ICD-10-CM

## 2024-04-10 PROCEDURE — 99308 SBSQ NF CARE LOW MDM 20: CPT | Performed by: INTERNAL MEDICINE

## 2024-04-10 NOTE — LETTER
Patient: Naida Bruno  : 1922    Encounter Date: 04/10/2024    Subjective  Patient ID: Naida Bruno is a 89 y.o. female who is long term resident being seen and evaluated for multiple medical problems.    Patient is a elderly female patient and family member is asking for hospice so patient is under hospice care.  Reason for hospice is advanced aging and debility and shortness of breath and severe COPD.  Patient is legally blind status remains.  Patient's daily routine is unchanged, no fluctuations in the weight, patient has history of diastolic heart failure frequent falls, skeletal fractures, depression, macular degeneration, hearing impairment, multiple orthopedic injuries.  Since she is admitted over here she has not have any major fall, excellent nursing care has been provided.         Review of Systems   Constitutional:  Positive for fatigue. Negative for fever.   Eyes:  Positive for visual disturbance.   Respiratory:  Positive for cough and choking. Negative for apnea and shortness of breath.    Cardiovascular:  Negative for leg swelling.   Gastrointestinal:  Positive for constipation. Negative for abdominal pain and diarrhea.   Musculoskeletal:  Positive for arthralgias, back pain and gait problem.   Neurological:  Positive for weakness.       Objective  /64   Pulse 82   Wt 77.1 kg (170 lb)   BMI 31.12 kg/m²     Physical Exam  Constitutional:       Appearance: Normal appearance. She is normal weight.   HENT:      Head: Atraumatic.   Eyes:      Conjunctiva/sclera: Conjunctivae normal.   Cardiovascular:      Rate and Rhythm: Normal rate and regular rhythm.   Pulmonary:      Effort: Pulmonary effort is normal.      Breath sounds: Rales present.   Abdominal:      Palpations: Abdomen is soft.   Musculoskeletal:         General: Normal range of motion.      Cervical back: Normal range of motion.   Skin:     General: Skin is warm and dry.   Assessment/Plan  Problem List Items Addressed This  Visit             ICD-10-CM    Hypertension - Primary I10    Chronic diastolic heart failure ([facility]) I50.32    Acquired hypothyroidism E03.9    Chronic obstructive pulmonary disease ([facility]) J44.9    Age-related physical debility R54   Age-related physical debility is a issue and appropriate for hospice care quality of life is issue, BP readings are stable, diastolic heart failure is not the major concern, prognosis remains poor, comfort measures supportive care, thyroid supplement, pain management, anxiety control, nasal oxygen, intermittent usage of nebulizer that is what we are doing.  Prognosis remains poor, discussed with nursing staff.     Goals    None           Electronically Signed By: Jayesh Arzate MD   4/13/24  1:10 PM

## 2024-04-13 VITALS
SYSTOLIC BLOOD PRESSURE: 127 MMHG | WEIGHT: 170 LBS | BODY MASS INDEX: 31.12 KG/M2 | HEART RATE: 82 BPM | DIASTOLIC BLOOD PRESSURE: 64 MMHG

## 2024-04-13 ASSESSMENT — ENCOUNTER SYMPTOMS
COUGH: 1
ABDOMINAL PAIN: 0
SHORTNESS OF BREATH: 0
WEAKNESS: 1
FEVER: 0
CONSTIPATION: 1
ARTHRALGIAS: 1
APNEA: 0
FATIGUE: 1
DIARRHEA: 0
BACK PAIN: 1
CHOKING: 1

## 2024-04-13 NOTE — PROGRESS NOTES
Subjective   Patient ID: Naida Bruno is a 89 y.o. female who is long term resident being seen and evaluated for multiple medical problems.    Patient is a elderly female patient and family member is asking for hospice so patient is under hospice care.  Reason for hospice is advanced aging and debility and shortness of breath and severe COPD.  Patient is legally blind status remains.  Patient's daily routine is unchanged, no fluctuations in the weight, patient has history of diastolic heart failure frequent falls, skeletal fractures, depression, macular degeneration, hearing impairment, multiple orthopedic injuries.  Since she is admitted over here she has not have any major fall, excellent nursing care has been provided.         Review of Systems   Constitutional:  Positive for fatigue. Negative for fever.   Eyes:  Positive for visual disturbance.   Respiratory:  Positive for cough and choking. Negative for apnea and shortness of breath.    Cardiovascular:  Negative for leg swelling.   Gastrointestinal:  Positive for constipation. Negative for abdominal pain and diarrhea.   Musculoskeletal:  Positive for arthralgias, back pain and gait problem.   Neurological:  Positive for weakness.       Objective   /64   Pulse 82   Wt 77.1 kg (170 lb)   BMI 31.12 kg/m²     Physical Exam  Constitutional:       Appearance: Normal appearance. She is normal weight.   HENT:      Head: Atraumatic.   Eyes:      Conjunctiva/sclera: Conjunctivae normal.   Cardiovascular:      Rate and Rhythm: Normal rate and regular rhythm.   Pulmonary:      Effort: Pulmonary effort is normal.      Breath sounds: Rales present.   Abdominal:      Palpations: Abdomen is soft.   Musculoskeletal:         General: Normal range of motion.      Cervical back: Normal range of motion.   Skin:     General: Skin is warm and dry.   Assessment/Plan   Problem List Items Addressed This Visit             ICD-10-CM    Hypertension - Primary I10    Chronic  diastolic heart failure ([facility]) I50.32    Acquired hypothyroidism E03.9    Chronic obstructive pulmonary disease (Multi) J44.9    Age-related physical debility R54   Age-related physical debility is a issue and appropriate for hospice care quality of life is issue, BP readings are stable, diastolic heart failure is not the major concern, prognosis remains poor, comfort measures supportive care, thyroid supplement, pain management, anxiety control, nasal oxygen, intermittent usage of nebulizer that is what we are doing.  Prognosis remains poor, discussed with nursing staff.     Goals    None

## 2024-05-08 ENCOUNTER — NURSING HOME VISIT (OUTPATIENT)
Dept: POST ACUTE CARE | Facility: EXTERNAL LOCATION | Age: 89
End: 2024-05-08
Payer: MEDICARE

## 2024-05-08 DIAGNOSIS — E03.9 ACQUIRED HYPOTHYROIDISM: ICD-10-CM

## 2024-05-08 DIAGNOSIS — R54 AGE-RELATED PHYSICAL DEBILITY: ICD-10-CM

## 2024-05-08 DIAGNOSIS — R53.81 PHYSICAL DECONDITIONING: ICD-10-CM

## 2024-05-08 DIAGNOSIS — J44.9 CHRONIC OBSTRUCTIVE PULMONARY DISEASE, UNSPECIFIED COPD TYPE (MULTI): ICD-10-CM

## 2024-05-08 DIAGNOSIS — I50.32 CHRONIC DIASTOLIC HEART FAILURE (MULTI): Primary | ICD-10-CM

## 2024-05-08 PROCEDURE — 99308 SBSQ NF CARE LOW MDM 20: CPT | Performed by: INTERNAL MEDICINE

## 2024-05-08 NOTE — LETTER
Patient: Naida Bruno  : 1922    Encounter Date: 2024    Subjective  Patient ID: Naida Bruno is a 89 y.o. female who is long term resident being seen and evaluated for multiple medical problems.    89-year-old female patient remains under hospice care, she is legally blind, excellent nursing care has been provided, no breathing compromise, she has been assisted for all kind of care and activities.  Does not have any pain, does not have any lethargic, daily routine has been well-kept and maintained, we are not doing any sort of interventions, we are not doing any sort of test or investigations, completely hospice care supportive care conservative care.  Symptom management has been done.         Review of Systems   Constitutional:  Negative for activity change and appetite change.   Eyes:  Positive for visual disturbance.   Respiratory:  Negative for apnea, cough and shortness of breath.    Cardiovascular:  Negative for leg swelling.   Gastrointestinal:  Positive for constipation. Negative for nausea.   Musculoskeletal:  Positive for gait problem.   Neurological:  Positive for weakness.       Objective  /76   Pulse 70   Wt 77.1 kg (170 lb)   BMI 31.12 kg/m²     Physical Exam  Constitutional:       Appearance: Normal appearance. She is normal weight.   HENT:      Head: Atraumatic.   Eyes:      Conjunctiva/sclera: Conjunctivae normal.   Cardiovascular:      Rate and Rhythm: Normal rate and regular rhythm.   Pulmonary:      Effort: Pulmonary effort is normal.      Breath sounds: Rales present.   Abdominal:      Palpations: Abdomen is soft.   Musculoskeletal:         General: Normal range of motion.      Cervical back: Normal range of motion.   Skin:     General: Skin is warm and dry.   Neurological:      General: No focal deficit present.   Assessment/Plan  Problem List Items Addressed This Visit             ICD-10-CM    Physical deconditioning R53.81    Chronic diastolic heart failure  (Multi) - Primary I50.32    Acquired hypothyroidism E03.9    Chronic obstructive pulmonary disease ([facility]) J44.9    Age-related physical debility R54   Hospice care related orders and follow-ups were reviewed, patient is on thyroid supplement, patient is on nasal oxygen, patient has been on anxiolytic, SSRI to be continued.  Skin turgor remains poor, appetite has been variable, make sure that patient has a proper oral intake, long-term prognosis remains guarded, continue medications with supportive care, denies any pain or discomfort and does not have any restlessness or agitation.     Goals    None           Electronically Signed By: Jayesh Arzate MD   5/10/24  7:47 PM

## 2024-05-10 VITALS
BODY MASS INDEX: 31.12 KG/M2 | HEART RATE: 70 BPM | WEIGHT: 170 LBS | SYSTOLIC BLOOD PRESSURE: 120 MMHG | DIASTOLIC BLOOD PRESSURE: 76 MMHG

## 2024-05-10 ASSESSMENT — ENCOUNTER SYMPTOMS
APPETITE CHANGE: 0
CONSTIPATION: 1
SHORTNESS OF BREATH: 0
NAUSEA: 0
APNEA: 0
WEAKNESS: 1
COUGH: 0
ACTIVITY CHANGE: 0

## 2024-05-10 NOTE — PROGRESS NOTES
Subjective   Patient ID: Naida Bruno is a 89 y.o. female who is long term resident being seen and evaluated for multiple medical problems.    89-year-old female patient remains under hospice care, she is legally blind, excellent nursing care has been provided, no breathing compromise, she has been assisted for all kind of care and activities.  Does not have any pain, does not have any lethargic, daily routine has been well-kept and maintained, we are not doing any sort of interventions, we are not doing any sort of test or investigations, completely hospice care supportive care conservative care.  Symptom management has been done.         Review of Systems   Constitutional:  Negative for activity change and appetite change.   Eyes:  Positive for visual disturbance.   Respiratory:  Negative for apnea, cough and shortness of breath.    Cardiovascular:  Negative for leg swelling.   Gastrointestinal:  Positive for constipation. Negative for nausea.   Musculoskeletal:  Positive for gait problem.   Neurological:  Positive for weakness.       Objective   /76   Pulse 70   Wt 77.1 kg (170 lb)   BMI 31.12 kg/m²     Physical Exam  Constitutional:       Appearance: Normal appearance. She is normal weight.   HENT:      Head: Atraumatic.   Eyes:      Conjunctiva/sclera: Conjunctivae normal.   Cardiovascular:      Rate and Rhythm: Normal rate and regular rhythm.   Pulmonary:      Effort: Pulmonary effort is normal.      Breath sounds: Rales present.   Abdominal:      Palpations: Abdomen is soft.   Musculoskeletal:         General: Normal range of motion.      Cervical back: Normal range of motion.   Skin:     General: Skin is warm and dry.   Neurological:      General: No focal deficit present.   Assessment/Plan   Problem List Items Addressed This Visit             ICD-10-CM    Physical deconditioning R53.81    Chronic diastolic heart failure (Multi) - Primary I50.32    Acquired hypothyroidism E03.9    Chronic  obstructive pulmonary disease (Multi) J44.9    Age-related physical debility R54   Hospice care related orders and follow-ups were reviewed, patient is on thyroid supplement, patient is on nasal oxygen, patient has been on anxiolytic, SSRI to be continued.  Skin turgor remains poor, appetite has been variable, make sure that patient has a proper oral intake, long-term prognosis remains guarded, continue medications with supportive care, denies any pain or discomfort and does not have any restlessness or agitation.     Goals    None